# Patient Record
Sex: FEMALE | Race: WHITE | NOT HISPANIC OR LATINO | URBAN - METROPOLITAN AREA
[De-identification: names, ages, dates, MRNs, and addresses within clinical notes are randomized per-mention and may not be internally consistent; named-entity substitution may affect disease eponyms.]

---

## 2023-05-19 LAB
NATRIURETIC PEPTIDE B (PG/ML) IN SER/PLAS: 24 PG/ML (ref 0–99)
TROPONIN I, HIGH SENSITIVITY: 32 NG/L (ref 0–34)

## 2023-08-24 PROBLEM — C77.3 CARCINOMA OF LEFT BREAST METASTATIC TO AXILLARY LYMPH NODE (MULTI): Status: ACTIVE | Noted: 2023-08-24

## 2023-08-24 PROBLEM — N63.21 MASS OF UPPER OUTER QUADRANT OF LEFT BREAST: Status: ACTIVE | Noted: 2023-08-24

## 2023-08-24 PROBLEM — G43.909 MIGRAINE: Status: ACTIVE | Noted: 2023-08-24

## 2023-08-24 PROBLEM — C50.412: Status: ACTIVE | Noted: 2023-08-24

## 2023-08-24 PROBLEM — R59.0 AXILLARY LYMPHADENOPATHY: Status: ACTIVE | Noted: 2023-08-24

## 2023-08-24 PROBLEM — B37.31 CANDIDIASIS, VAGINA: Status: ACTIVE | Noted: 2023-08-24

## 2023-08-24 PROBLEM — C50.912 CARCINOMA OF LEFT BREAST METASTATIC TO AXILLARY LYMPH NODE (MULTI): Status: ACTIVE | Noted: 2023-08-24

## 2023-08-24 PROBLEM — Z17.0: Status: ACTIVE | Noted: 2023-08-24

## 2023-08-24 RX ORDER — DICYCLOMINE HYDROCHLORIDE 10 MG/1
10 CAPSULE ORAL 4 TIMES DAILY PRN
COMMUNITY

## 2023-08-24 RX ORDER — SUMATRIPTAN 50 MG/1
TABLET, FILM COATED ORAL
COMMUNITY
Start: 2015-04-15

## 2023-08-24 RX ORDER — METHOCARBAMOL 500 MG/1
500 TABLET, FILM COATED ORAL 4 TIMES DAILY PRN
COMMUNITY
Start: 2015-04-15

## 2023-09-14 ENCOUNTER — HOSPITAL ENCOUNTER (OUTPATIENT)
Dept: DATA CONVERSION | Facility: HOSPITAL | Age: 48
Discharge: HOME | End: 2023-09-14
Payer: OTHER GOVERNMENT

## 2023-09-14 DIAGNOSIS — C50.912 MALIGNANT NEOPLASM OF UNSPECIFIED SITE OF LEFT FEMALE BREAST (MULTI): ICD-10-CM

## 2023-09-14 LAB
MRSA DNA SPEC QL NAA+PROBE: NEGATIVE
SPECIMEN SOURCE: NORMAL

## 2023-09-20 ENCOUNTER — HOSPITAL ENCOUNTER (OUTPATIENT)
Dept: DATA CONVERSION | Facility: HOSPITAL | Age: 48
Discharge: HOME | End: 2023-09-20
Payer: OTHER GOVERNMENT

## 2023-09-20 DIAGNOSIS — Z45.2 ENCOUNTER FOR ADJUSTMENT AND MANAGEMENT OF VASCULAR ACCESS DEVICE: ICD-10-CM

## 2023-09-20 DIAGNOSIS — C50.912 MALIGNANT NEOPLASM OF UNSPECIFIED SITE OF LEFT FEMALE BREAST (MULTI): ICD-10-CM

## 2023-09-20 DIAGNOSIS — Q63.1 LOBULATED, FUSED AND HORSESHOE KIDNEY: ICD-10-CM

## 2023-09-20 DIAGNOSIS — C50.812 MALIGNANT NEOPLASM OF OVERLAPPING SITES OF LEFT FEMALE BREAST (MULTI): ICD-10-CM

## 2023-10-02 DIAGNOSIS — C77.3 CARCINOMA OF LEFT BREAST METASTATIC TO AXILLARY LYMPH NODE (MULTI): ICD-10-CM

## 2023-10-02 DIAGNOSIS — C50.912 CARCINOMA OF LEFT BREAST METASTATIC TO AXILLARY LYMPH NODE (MULTI): ICD-10-CM

## 2023-10-10 ENCOUNTER — OFFICE VISIT (OUTPATIENT)
Dept: SURGICAL ONCOLOGY | Facility: CLINIC | Age: 48
End: 2023-10-10
Payer: OTHER GOVERNMENT

## 2023-10-10 DIAGNOSIS — C50.912 MALIGNANT NEOPLASM OF LEFT BREAST IN FEMALE, ESTROGEN RECEPTOR POSITIVE, UNSPECIFIED SITE OF BREAST (MULTI): Primary | ICD-10-CM

## 2023-10-10 DIAGNOSIS — Z17.0 MALIGNANT NEOPLASM OF LEFT BREAST IN FEMALE, ESTROGEN RECEPTOR POSITIVE, UNSPECIFIED SITE OF BREAST (MULTI): Primary | ICD-10-CM

## 2023-10-10 PROCEDURE — 99024 POSTOP FOLLOW-UP VISIT: CPT | Performed by: SURGERY

## 2023-10-11 NOTE — PROGRESS NOTES
Subjective   Sapna Henriquez is a 47 y.o. female here for a postoperative visit.  She had a left mag seed localized lumpectomy and axillary sentinel lymph node biopsy on September 20, 2023.  She had a pathologic complete response.  SLN 0/3 with hyalinized fibrosis and foreign body reaction in 2 lymph nodes    She is doing well following surgery and has no complaints or concerns.    Objective     Physical Exam  Chest:          Comments: Both incisions are healing well with no evidence of infection, seroma or hematoma.      Alert and oriented.      Assessment/Plan   Stage  ypT0 N0 M0  breast cancer.  Clinical Stage 2B (T2 N2) 1/23    Diagnosed with left breast cancer and a positive left axillary lymph node in January 2023.  2.2 cm left breast mass in the 3 o'clock position and 3 abnormal axillary lymph nodes seen on ultrasound.  On 1/10/2023 left breast ultrasound core biopsy yielded invasive ductal carcinoma, grade 2-3, ductal carcinoma in situ, high nuclear grade, focus suspicious for lymphovascular space invasion. Left axillary ultrasound-guided biopsy yielded metastatic carcinoma involving cores of lymphoid tissue, ER +95%, DC +70%, HER2 equivocal not amplified by dual-maliha, MammaPrint high risk -0.154 luminal B type.  Clinical Stage 2B (T2 N2)  Pre-treatment CT showed a prominent internal mammary LN.    She had neoadjuvant chemotherapy (ddACx4 and taxol x12; Dr. Best). Her last chemotherapy was August 11, 2023.    Left mag seed localized lumpectomy and sentinel lymph node biopsy on September 20, 2023.      She is doing well following surgery.  She may resume all activities without restrictions.  Recommendations from interdisciplinary breast tumor conference were reviewed with the patient.    Follow-up with Dr. Best  2.   Follow-up with radiation oncology for radiation therapy.  3.   Follow-up with me in May 2024 with a bilateral mammogram.        Carol Rojas MD

## 2023-10-19 DIAGNOSIS — Z17.0 MALIGNANT NEOPLASM OF UPPER-OUTER QUADRANT OF LEFT BREAST IN FEMALE, ESTROGEN RECEPTOR POSITIVE (MULTI): ICD-10-CM

## 2023-10-19 DIAGNOSIS — C50.412 MALIGNANT NEOPLASM OF UPPER-OUTER QUADRANT OF LEFT BREAST IN FEMALE, ESTROGEN RECEPTOR POSITIVE (MULTI): ICD-10-CM

## 2023-10-20 ENCOUNTER — TELEPHONE (OUTPATIENT)
Dept: HEMATOLOGY/ONCOLOGY | Facility: HOSPITAL | Age: 48
End: 2023-10-20
Payer: MEDICAID

## 2023-10-20 ENCOUNTER — APPOINTMENT (OUTPATIENT)
Dept: HEMATOLOGY/ONCOLOGY | Facility: HOSPITAL | Age: 48
End: 2023-10-20
Payer: MEDICAID

## 2023-10-20 NOTE — TELEPHONE ENCOUNTER
Call returned and patient aware that she can take over the counter meds or see PCP for sore throat. She is agreeable and rescheduled visit for today.

## 2023-10-25 ENCOUNTER — HOSPITAL ENCOUNTER (OUTPATIENT)
Dept: RADIATION ONCOLOGY | Facility: CLINIC | Age: 48
Setting detail: RADIATION/ONCOLOGY SERIES
Discharge: STILL ADMITTED OR ADMINISTRATIVE | End: 2023-10-25
Payer: MEDICAID

## 2023-10-25 VITALS
BODY MASS INDEX: 24.62 KG/M2 | SYSTOLIC BLOOD PRESSURE: 127 MMHG | DIASTOLIC BLOOD PRESSURE: 80 MMHG | RESPIRATION RATE: 16 BRPM | HEART RATE: 68 BPM | WEIGHT: 122.14 LBS | HEIGHT: 59 IN | TEMPERATURE: 98 F | OXYGEN SATURATION: 98 %

## 2023-10-25 DIAGNOSIS — Z17.0 MALIGNANT NEOPLASM OF UPPER-OUTER QUADRANT OF LEFT BREAST IN FEMALE, ESTROGEN RECEPTOR POSITIVE (MULTI): ICD-10-CM

## 2023-10-25 DIAGNOSIS — C77.1: Primary | ICD-10-CM

## 2023-10-25 DIAGNOSIS — C77.3: Primary | ICD-10-CM

## 2023-10-25 DIAGNOSIS — C50.412 MALIGNANT NEOPLASM OF UPPER-OUTER QUADRANT OF LEFT BREAST IN FEMALE, ESTROGEN RECEPTOR POSITIVE (MULTI): ICD-10-CM

## 2023-10-25 DIAGNOSIS — C50.912: Primary | ICD-10-CM

## 2023-10-25 PROCEDURE — 99205 OFFICE O/P NEW HI 60 MIN: CPT | Performed by: STUDENT IN AN ORGANIZED HEALTH CARE EDUCATION/TRAINING PROGRAM

## 2023-10-25 PROCEDURE — 99215 OFFICE O/P EST HI 40 MIN: CPT | Performed by: STUDENT IN AN ORGANIZED HEALTH CARE EDUCATION/TRAINING PROGRAM

## 2023-10-25 SDOH — ECONOMIC STABILITY: FOOD INSECURITY: WITHIN THE PAST 12 MONTHS, YOU WORRIED THAT YOUR FOOD WOULD RUN OUT BEFORE YOU GOT MONEY TO BUY MORE.: NEVER TRUE

## 2023-10-25 SDOH — ECONOMIC STABILITY: FOOD INSECURITY: WITHIN THE PAST 12 MONTHS, THE FOOD YOU BOUGHT JUST DIDN'T LAST AND YOU DIDN'T HAVE MONEY TO GET MORE.: NEVER TRUE

## 2023-10-25 ASSESSMENT — LIFESTYLE VARIABLES
HOW MANY STANDARD DRINKS CONTAINING ALCOHOL DO YOU HAVE ON A TYPICAL DAY: 3 OR 4
AUDIT-C TOTAL SCORE: 4
HOW OFTEN DO YOU HAVE A DRINK CONTAINING ALCOHOL: 2-3 TIMES A WEEK
HOW OFTEN DO YOU HAVE SIX OR MORE DRINKS ON ONE OCCASION: NEVER
SKIP TO QUESTIONS 9-10: 0

## 2023-10-25 ASSESSMENT — SOCIAL DETERMINANTS OF HEALTH (SDOH): IN THE PAST 12 MONTHS, HAS THE ELECTRIC, GAS, OIL, OR WATER COMPANY THREATENED TO SHUT OFF SERVICE IN YOUR HOME?: NO

## 2023-10-25 ASSESSMENT — PATIENT HEALTH QUESTIONNAIRE - PHQ9
2. FEELING DOWN, DEPRESSED OR HOPELESS: NOT AT ALL
1. LITTLE INTEREST OR PLEASURE IN DOING THINGS: NOT AT ALL
SUM OF ALL RESPONSES TO PHQ9 QUESTIONS 1 & 2: 0

## 2023-10-25 ASSESSMENT — PAIN SCALES - GENERAL: PAINLEVEL: 0-NO PAIN

## 2023-10-25 ASSESSMENT — ENCOUNTER SYMPTOMS
DEPRESSION: 0
LOSS OF SENSATION IN FEET: 1
OCCASIONAL FEELINGS OF UNSTEADINESS: 0

## 2023-10-25 NOTE — PROGRESS NOTES
10/25/23 1300 New patient here today with DTR to see Dr. Calles for left breast invasive ductal carcinoma stage IIB, ER 95% (+), VT 70% (+), Her2 (-). Patient completed ddAC and weekly taxol x12 7/2023 and has a follow up appointment with Dr. Downing on 11/3/23. She also has appointment with Dr. Rojas on 5/2024 with a mammogram prior. Patient c/o hot flashes, rotten teeth, and numbness and tingling in fingers and toes. Patient will discuss symptoms with Dr. Downing but we gave the okay for patient to follow up with dentist asasusie. Per Dr. Calles, patient to be set up for ct/sim.  to schedule and call patient. Patient asking appropriate questions.  Patient given and reviewed information on external beam radiation and radiation to the  breast. We reviewed side effects of radiation including but not limited to skin irritation, and fatigue. Patient verbalizes understanding with verbal teach back. Lila Ramírez MSN, RN, OCN

## 2023-10-25 NOTE — PROGRESS NOTES
Radiation Oncology Outpatient Consult    Patient Name:  Sapna Henriquez  MRN:  [id number]  :  1975    Referring Provider: Carol Rojas MD  Primary Care Provider: José Luis Zambrano DO  Care Team: Patient Care Team:  José Luis Zambrano DO as PCP - General  Stew Bustos MD as Primary Care Provider  Torsten Best MD as Consulting Physician (Hematology and Oncology)    Date of Service: 10/25/2023     Diagnosis: Malignant neoplasm of upper-outer quadrant of left breast, estrogen receptor positive (CMS/HCC), Clinical: Stage IIIB (cT2, cN3b, cM0, G3, ER+, IA+, HER2: Equivocal)  Malignant neoplasm of upper-outer quadrant of left breast, estrogen receptor positive (CMS/HCC), Pathologic: No Stage Recommended (ypT0, pN0, cM0)    Previous Treatments:  2023: Completed neoadjuvant dose dense AC + T  2023: left breast lumpectomy and sentinel node biopsy    History of Present Illness:  Ms. Henriquez is a 47 y.o. woman whose primary physician palpated a left breast mass in 2022. Targeted ultrasound and diagnostic mammogram on 2022 showed a 2.2 x 2.2 x 1.4 cm mass in the left breast at the 3 o'clock position, 4 cm from the nipple. There were multiple abnormal appearing lymph nodes in the left axilla. Ultrasound-guided biopsy of the left breast mass and an axillary node  was performed on 1/10/2023, with pathology positive for invasive ductal carcinoma, grade 2-3 , ER/IA positive, HER2/xin equivocal/negative on FISH. CT chest abdomen and pelvis was performed on 2023, and showed multiple enlarged left axillary lymph nodes. Additionally seen was an enlarged left internal mammary lymph node, which measured up to 1 cm in size. No evidence of distant metastatic disease was seen. MammaPrint showed high risk, -0.154, luminal type B. She then underwent neoadjuvant dose dense AC+T, and completed her systemic therapy on 2023.     She had a right breast lumpectomy and sentinel node biopsy on 2023, which showed focal atypical  ductal hyperplasia in the breast, with a cavitated area with granulomatous inflammation consistent with the previous biopsy site. There were 3 sentinel nodes removed, which were all negative for metastatic carcinoma. One of the lymph nodes showed an area of hyalinized fibrosis and foreign body reaction.    Currently, she denies any breast pain or discomfort. She denies any bleeding, drainage, or wound healing difficulties. She has been having more frequent hot flashes since her chemotherapy. She has neuropathy in her hands and feet which is overall unchanged.           The patient was seen for an opinion regarding radiation options for the diagnosis above. I personally reviewed the available outside records and imaging studies as detailed in the HPI. The allergies, medications, past medical history, surgical history, social history, family history, and review of systems were reviewed.     Prior Radiotherapy:  No  Radiation Treatments       No radiation treatments to show. (Treatments may have been administered in another system.)          Current Systemic Treatment:  No     Presence of Pacemaker or ICD:  No    Past Medical History:  History reviewed. No pertinent past medical history.     Past Surgical History:    Past Surgical History:   Procedure Laterality Date    ENDOMETRIAL ABLATION      TUBAL LIGATION          Family History:  Cancer-related family history includes Breast cancer in her maternal grandmother, maternal great-grandfather, and mother; Ovarian cancer in her maternal grandmother.    Social History:    Social History     Tobacco Use    Smoking status: Never     Passive exposure: Never    Smokeless tobacco: Never   Vaping Use    Vaping Use: Never used   Substance Use Topics    Alcohol use: Yes     Alcohol/week: 4.0 standard drinks of alcohol     Types: 4 Shots of liquor per week       Allergies:  No Known Allergies     Medications:    Current Outpatient Medications:     dicyclomine (Bentyl) 10 mg  "capsule, Take 1 capsule (10 mg) by mouth 4 times a day as needed., Disp: , Rfl:     methocarbamol (Robaxin) 500 mg tablet, Take 1 tablet (500 mg) by mouth 4 times a day as needed., Disp: , Rfl:     SUMAtriptan (Imitrex) 50 mg tablet, 1 tablet PO for severe headache, may repeat in 2 hrs(max 200 mg/24 hrs) Orally, Disp: , Rfl:       Review of Systems:  Review of Systems - Oncology  The patient's current pain level was assessed.  They report currently having a pain of 0 out of 10.  They feel their pain is under control without the use of pain medications.    Performance Status:  The Karnofsky performance scale today is 80, Normal activity with effort; some signs or symptoms of disease (ECOG equivalent 1).        OBJECTIVE  Physical Exam:  /80 (BP Location: Right arm, Patient Position: Sitting)   Pulse 68   Temp 36.7 °C (98 °F) (Tympanic)   Resp 16   Ht 1.493 m (4' 10.78\")   Wt 55.4 kg (122 lb 2.2 oz)   SpO2 98%   BMI 24.85 kg/m²    Physical Exam  Vitals reviewed. Exam conducted with a chaperone present.   Constitutional:       General: She is not in acute distress.     Appearance: Normal appearance. She is not ill-appearing.   HENT:      Head: Normocephalic and atraumatic.      Right Ear: External ear normal.      Left Ear: External ear normal.      Mouth/Throat:      Mouth: Mucous membranes are moist.      Pharynx: Oropharynx is clear.   Eyes:      Conjunctiva/sclera: Conjunctivae normal.   Cardiovascular:      Rate and Rhythm: Normal rate.   Pulmonary:      Effort: Pulmonary effort is normal. No respiratory distress.   Chest:   Breasts:     Breasts are symmetrical.      Right: No swelling, bleeding, mass, skin change or tenderness.      Left: No swelling, bleeding, mass, skin change or tenderness.          Comments: Well healhed   Abdominal:      General: There is no distension.   Musculoskeletal:         General: Normal range of motion.      Cervical back: Normal range of motion and neck supple. "   Lymphadenopathy:      Upper Body:      Right upper body: No axillary adenopathy.      Left upper body: No axillary adenopathy.   Skin:     General: Skin is warm and dry.   Neurological:      Mental Status: She is alert and oriented to person, place, and time.   Psychiatric:         Mood and Affect: Mood normal.         Behavior: Behavior normal.          Laboratory Review:  There are no laboratory contraindications to radiation therapy.    The pertinent lab results were reviewed and discussed with the patient.  Notably, per HPI       Pathology Review:  The pertinent pathology results were reviewed and discussed with the patient.  Notably, per HPI      Imaging:  The pertinent imaging results were reviewed and discussed with the patient.  Notably, per HPI        ASSESSMENT:  Ms. Henriquez is a 47 y.o. woman with a diagnosis of Malignant neoplasm of upper-outer quadrant of left breast, estrogen receptor positive (CMS/HCC), Clinical: Stage IIIB (cT2, cN3b, cM0, G3, ER+, [place]+, HER2: Equivocal)  Malignant neoplasm of upper-outer quadrant of left breast, estrogen receptor positive (CMS/HCC), Pathologic: No Stage Recommended (ypT0, pN0, cM0), status post neoadjuvant chemotherapy, followed by lumpectomy and sentinel node biopsy, here for a discussion of adjuvant radiation treatment.      PLAN:  We had an extensive discussion regarding role of radiation in this setting.     We reviewed the standard of care for post-lumpectomy treatment for locally advanced breast cancer, which would include adjuvant radiotherapy to help decrease the risk of local recurrence in the breast. This would consist of hypofractionated radiotherapy, to a dose of 42.56 Gy in 16 fractions. Given the presence of at least 1 axillary lymph node, as well as a highly suspicious intramammary node, we will plan on treating breast and regional lymph nodes; we would also plan on a simultaneous integrated boost to the previously involved/undissected internal mammary  node, as well as to the lumpectomy bed, given her younger age and high-grade disease.    We discussed the acute side effects of therapies and potential late toxicities. During the course of radiation, acute side effects could include, but are not limited to fatigue, dermatitis, or chest wall discomfort. The typical timeline for the resolution of these effects as well as available supportive therapies were reviewed. Potential long term side effects can include, but are not limited to, lymphedema, fibrosis, increased heart disease risk, rib fractures, radiation pneumonitis, and a small risk of second malignancies. Given her left-sided disease, we will plan on treatment using ABC for breathing control to help reduce incidental dose to the heart.    After the discussion, the patient was given the opportunity to ask questions which were subsequently answered, and our contact information was given.    Please contact our department with any further questions regarding the plan of management.    The length of the visit was 60 minutes. We spent more than 50% of this time discussing treatment options with the patient.    Recommendations:  - Whole breast and regional adriana radiation to a dose of 42.56 Gy in 16 fractions with a simultaneous integrated boost to the positive intramammary node and lumpectomy bed  - CT simulation in the coming weeks   NCCN Guidelines were applicable to guide this patients treatment plan.   Starla Calles DO

## 2023-11-03 ENCOUNTER — LAB (OUTPATIENT)
Dept: LAB | Facility: HOSPITAL | Age: 48
End: 2023-11-03
Payer: MEDICAID

## 2023-11-03 ENCOUNTER — OFFICE VISIT (OUTPATIENT)
Dept: HEMATOLOGY/ONCOLOGY | Facility: HOSPITAL | Age: 48
End: 2023-11-03
Payer: MEDICAID

## 2023-11-03 VITALS
OXYGEN SATURATION: 97 % | DIASTOLIC BLOOD PRESSURE: 68 MMHG | TEMPERATURE: 96.8 F | SYSTOLIC BLOOD PRESSURE: 100 MMHG | RESPIRATION RATE: 16 BRPM | BODY MASS INDEX: 24.31 KG/M2 | HEIGHT: 59 IN | HEART RATE: 89 BPM | WEIGHT: 120.59 LBS

## 2023-11-03 DIAGNOSIS — Z17.0 MALIGNANT NEOPLASM OF UPPER-OUTER QUADRANT OF LEFT BREAST IN FEMALE, ESTROGEN RECEPTOR POSITIVE (MULTI): Primary | ICD-10-CM

## 2023-11-03 DIAGNOSIS — C50.412 MALIGNANT NEOPLASM OF UPPER-OUTER QUADRANT OF LEFT BREAST IN FEMALE, ESTROGEN RECEPTOR POSITIVE (MULTI): ICD-10-CM

## 2023-11-03 DIAGNOSIS — Z17.0 MALIGNANT NEOPLASM OF UPPER-OUTER QUADRANT OF LEFT BREAST IN FEMALE, ESTROGEN RECEPTOR POSITIVE (MULTI): ICD-10-CM

## 2023-11-03 DIAGNOSIS — C50.412 MALIGNANT NEOPLASM OF UPPER-OUTER QUADRANT OF LEFT BREAST IN FEMALE, ESTROGEN RECEPTOR POSITIVE (MULTI): Primary | ICD-10-CM

## 2023-11-03 LAB
ESTRADIOL SERPL-MCNC: <19 PG/ML
FSH SERPL-ACNC: 116.6 IU/L
HOLD SPECIMEN: NORMAL
HOLD SPECIMEN: NORMAL
LH SERPL-ACNC: 86.2 IU/L

## 2023-11-03 PROCEDURE — 82670 ASSAY OF TOTAL ESTRADIOL: CPT

## 2023-11-03 PROCEDURE — 36415 COLL VENOUS BLD VENIPUNCTURE: CPT

## 2023-11-03 PROCEDURE — 1036F TOBACCO NON-USER: CPT | Performed by: STUDENT IN AN ORGANIZED HEALTH CARE EDUCATION/TRAINING PROGRAM

## 2023-11-03 PROCEDURE — 83002 ASSAY OF GONADOTROPIN (LH): CPT

## 2023-11-03 PROCEDURE — 99214 OFFICE O/P EST MOD 30 MIN: CPT | Performed by: STUDENT IN AN ORGANIZED HEALTH CARE EDUCATION/TRAINING PROGRAM

## 2023-11-03 RX ORDER — GABAPENTIN 100 MG/1
100 CAPSULE ORAL AS NEEDED
COMMUNITY

## 2023-11-03 RX ORDER — VENLAFAXINE HYDROCHLORIDE 37.5 MG/1
37.5 CAPSULE, EXTENDED RELEASE ORAL DAILY
Qty: 30 CAPSULE | Refills: 2 | Status: SHIPPED | OUTPATIENT
Start: 2023-11-03 | End: 2024-02-01

## 2023-11-03 ASSESSMENT — PAIN SCALES - GENERAL: PAINLEVEL: 0-NO PAIN

## 2023-11-03 NOTE — PROGRESS NOTES
Patient ID: Sapna Henriquez is a 47 y.o. female.    The patient presents to clinic today for her history of breast cancer.    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, estrogen receptor positive (CMS/HCC)  Staging form: Breast, AJCC 8th Edition  - Clinical stage from 10/25/2023: Stage IIIB (cT2, cN3b, cM0, G3, ER+, TX+, HER2: Equivocal) - Signed by Starla Calles DO on 10/25/2023  - Pathologic stage from 10/25/2023: No Stage Recommended (ypT0, pN0, cM0) - Signed by Starla Calles DO on 10/25/2023        Diagnostic/Therapeutic History:    On 12/21/2022 she had diagnostic mammogram that was done that showed heterogeneously dense breast tissue with a focal symptom marker overlying the outer left breast  at anterior depth, adjacent to the marker is an irregular increased density mass with architectural distortion.  BI-RADS Category 5   Ultrasound: This corresponds with an area of the lump localized at the 3 o'clock position of the left breast 4 cm from the nipple there was a 2.2 x 2.2 x 1.1 cm lobulated irregularly irregular hypoechoic mass with multiple abnormal appearing left axillary  lymph nodes.      On 1/10/2023 she had left breast ultrasound-guided core needle biopsy showed invasive ductal carcinoma, grade 2-3, DCIS, high nuclear grade ER 95%, TX 70%, HER2 2+, nonamplified by dual ROMERO MammaPrint index: -0.154, high risk luminal B left axilla ultrasound-guided  core needle biopsy showed metastatic carcinoma       CT C/A/P showed focal breast tissue nodularity and prominent left axillary LN in addition to left Internal mammary LN with no evidence of distant disease; no evidence of bone mets on bone scan     8/11/2023: completed neoadjuvant chemotherapy with ddAC followed by weekly taxol    09/20/2023: Dr Rojas Performed a left PM and SLNB (0/3) that showed a complete pathological response           History of Present Illness (HPI)/Interval History:  Doing well, recovering well from  surgery  Does have hot flushes, quite severe     She denies any fevers or chills.    She denies any chest pain or breathing issues.     She denies any vision changes or headache issues, dizziness, loss of balance,  and no falls.     She denies any new or unexplained bone aches or pains.    She denies any skin lesions or masses, hair loss, nail changes, or oral sores.    She reports a normal appetite and normal bowel movements. Her weight is stable.     PMH:  No PMH , horseshoe kidney     PSH: tubal ligation,      Allergies: NKDA     No meds     Reproductive hx: menarche 14, 9 years ago no menses since ligation, , 2nd one who got , OCP x couple years, No HRT     Family hx: mom at age 41 with breast cancer, grandma breast cancer and ovarian cancer  at 73, greaatgrandma breast cancer  at 58, uncle for mom’s side colon cancer, grandfather on mother side has skin cancer     Social hx: non smoker, alcohol twice a week          Review of Systems:  14-point ROS otherwise negative, as per HPI.    No past medical history on file.    Past Surgical History:   Procedure Laterality Date    ENDOMETRIAL ABLATION      TUBAL LIGATION         Social History     Socioeconomic History    Marital status: Legally      Spouse name: Not on file    Number of children: Not on file    Years of education: Not on file    Highest education level: Not on file   Occupational History    Not on file   Tobacco Use    Smoking status: Never     Passive exposure: Never    Smokeless tobacco: Never   Vaping Use    Vaping Use: Never used   Substance and Sexual Activity    Alcohol use: Yes     Alcohol/week: 4.0 standard drinks of alcohol     Types: 4 Shots of liquor per week    Drug use: Not on file    Sexual activity: Not on file   Other Topics Concern    Not on file   Social History Narrative    Not on file     Social Determinants of Health     Financial Resource Strain: Not on file   Food Insecurity: No Food Insecurity  "(10/25/2023)    Hunger Vital Sign     Worried About Running Out of Food in the Last Year: Never true     Ran Out of Food in the Last Year: Never true   Transportation Needs: Not on file   Physical Activity: Not on file   Stress: Not on file   Social Connections: Not on file   Intimate Partner Violence: Not on file   Housing Stability: Not on file       No Known Allergies      Current Outpatient Medications:     gabapentin (Neurontin) 100 mg capsule, Take 1 capsule (100 mg) by mouth if needed., Disp: , Rfl:     dicyclomine (Bentyl) 10 mg capsule, Take 1 capsule (10 mg) by mouth 4 times a day as needed., Disp: , Rfl:     methocarbamol (Robaxin) 500 mg tablet, Take 1 tablet (500 mg) by mouth 4 times a day as needed., Disp: , Rfl:     SUMAtriptan (Imitrex) 50 mg tablet, 1 tablet PO for severe headache, may repeat in 2 hrs(max 200 mg/24 hrs) Orally, Disp: , Rfl:     traMADol (Ultram) 50 mg tablet, TAKE 1 TABLET BY MOUTH EVERY 6 HOURS AS NEEDED FOR PAIN (Patient not taking: Reported on 11/3/2023), Disp: 10 tablet, Rfl: 0    venlafaxine XR (Effexor XR) 37.5 mg 24 hr capsule, Take 1 capsule (37.5 mg) by mouth once daily. Do not crush or chew., Disp: 30 capsule, Rfl: 2     Objective    BSA: 1.51 meters squared  /68   Pulse 89   Temp 36 °C (96.8 °F) (Skin)   Resp 16   Ht 1.493 m (4' 10.78\")   Wt 54.7 kg (120 lb 9.5 oz)   SpO2 97%   BMI 24.54 kg/m²     ECOG Performance Status: 0    Physical Exam    GA: alert, oriented, cooperative  HEENT: anicteric sclera, well injected conjunctiva  Heart: RRR, no murmurs or gallops heard  Lung: CTAB  Abd: +BS, soft, non tender abdomen  Ext; peripheral pulses positive, warm extremities, no lower extremity edema   Breast;: well healed incision on the left side, no new LN or nodule    Laboratory Data:  Lab Results   Component Value Date    WBC 4.8 08/11/2023    HGB 12.7 08/11/2023    HCT 37.5 08/11/2023    MCV 91 08/11/2023     08/11/2023    ANC 6.42 04/28/2023       " Chemistry    Lab Results   Component Value Date/Time     08/11/2023 1018    K 3.7 08/11/2023 1018     08/11/2023 1018    CO2 25 08/11/2023 1018    BUN 13 08/11/2023 1018    CREATININE 0.70 08/11/2023 1018    Lab Results   Component Value Date/Time    CALCIUM 9.7 08/11/2023 1018    ALKPHOS 79 08/11/2023 1018    AST 25 08/11/2023 1018    ALT 38 08/11/2023 1018    BILITOT 0.6 08/11/2023 1018             Radiology:  XR breast specimen  PROCEDURE:         SURGICAL SPECIMEN XRAY - IMM  5003  REASON FOR EXAM: r92.8    RESULT: MRN: [id number]  Patient Name: CHARLINE MCMILLAN    STUDY:  SURGICAL SPECIMEN XRAY; 9/20/2023 1:33 pm    INDICATION:  r92.8. Left breast malignancy    COMPARISON:  None.    ACCESSION NUMBER(S):  [id number]    ORDERING CLINICIAN:  JUAN PABLO YA    TECHNIQUE:  Radiograph of the surgical breast specimen was obtained.    FINDINGS:  The specimen contains the Mag seed corresponding with grid positions H10 and  H 11. The minimally invasive breast biopsy clip is not included within the  specimen. This was discussed with the referring physician.    IMPRESSION:  Successful excision of the area of concern containing the Mag seed.    MACRO:  none  Dictation workstation:   [id number]  Patient was entered into a reminder system and will receive a notification  with a target date for their next exam.    Original Interpreting Physician:   LINDSAY BILLINGSLEY M.D.  Original Transcribed by/Date: MMODAL Sep 20 2023  1:30P  Original Electronically Signed by/Date: LINDSAY BILLINGSLEY M.D. Sep 20 2023  2:59P    Addendum Interpreting Physician:  Addendum Transcribed by/Date: NO ADDENDUM  Addendum Electronically Signed by/Date:  XR breast specimen  PROCEDURE:         SURGICAL SPECIMEN XRAY - IMM  5003  REASON FOR EXAM: c50.912    RESULT: MRN: [id number]  Patient Name: CHARLINE MCMILLAN    STUDY:  SURGICAL SPECIMEN XRAY; 9/20/2023 1:02 pm    INDICATION:  c50.912. Left breast malignancy    COMPARISON:  None.    ACCESSION  NUMBER(S):  [id number]    ORDERING CLINICIAN:  JUAN PABLO YA    TECHNIQUE:  Radiograph of the left axillary lymph node specimen was obtained.    FINDINGS:  The specimen is demarcated as lymph node the breast tissue demonstrates a  Mag seed corresponding with grid positions G5 and H 5. There is a breast  biopsy clip also included within the specimen corresponding with grid  positions J 4 and J 3.    IMPRESSION:  Successful excision of the left axillary Mag seed and breast biopsy clip.    MACRO:  none  Dictation workstation:   [id number]  Patient was entered into a reminder system and will receive a notification  with a target date for their next exam.    Original Interpreting Physician:   LINDSAY BILLINGSLEY M.D.  Original Transcribed by/Date: MMODAL Sep 20 2023  8:34A  Original Electronically Signed by/Date: LINDSAY BILLINGSLEY M.D. Sep 20 2023  2:58P    Addendum Interpreting Physician:  Addendum Transcribed by/Date: NO ADDENDUM  Addendum Electronically Signed by/Date:       No results found for this or any previous visit from the past 365 days.          Assessment/Plan:      47 year old female overall medically healthy with recent diagnosis of left sided cT2N1 IDC ER 95%, ID 70%, HER2 2+, nonamplified by dual ROMERO MammaPrint index: -0.154, high risk luminal  B left axilla ultrasound-guided core needle biopsy showed metastatic carcinoma, discussed in TB with plan for neoadjuvant chemotherapy      On 12/21/2022 she had diagnostic mammogram that was done that showed heterogeneously dense breast tissue with a focal symptom marker overlying the outer left breast at anterior depth, adjacent to the marker is an irregular increased density mass with  architectural distortion.  BI-RADS Category 5   Ultrasound: This corresponds with an area of the lump localized at the 3 o'clock position of the left breast 4 cm from the nipple there was a 2.2 x 2.2 x 1.1 cm lobulated irregularly  irregular hypoechoic mass with multiple abnormal appearing  left axillary lymph nodes.      On 1/10/2023 she had left breast ultrasound-guided core needle biopsy showed invasive ductal carcinoma, grade 2-3, DCIS, high nuclear grade ER 95%, MA 70%, HER2 2+, nonamplified by dual ROMERO MammaPrint index: -0.154, high risk luminal B left axilla ultrasound-guided  core needle biopsy showed metastatic carcinoma     I reviewed with her the events that led to her diagnosis of breast cancer. We reviewed all the procedures and diagnostic imaging she underwent thus far. I discussed the features of her breast cancer that include the size, grade, lymph node status and  hormone receptor/ her2-xin status.     CT C/A/P showed focal breast tissue nodularity and prominent left axillary LN in addition to left Internal mammary LN with no evidence of distant disease; no evidence of bone mets on bone scan     8/11/2023: completed neoadjuvant chemotherapy with ddAC followed by weekly taxol    09/20/2023: Dr Rojas Performed a left PM and SLNB (0/3) that showed a complete pathological response     - plan for Whole breast and regional adriana radiation to a dose of 42.56 Gy in 16 fractions with a simultaneous integrated boost to the positive intramammary node and lumpectomy bed    Fibroids  Pelvic/Uterine/Ovarian US: Fibroid in the right lower uterine body and Probable hemorrhagic corpus luteum left ovary.   - Follow-up with GYN      Thyroid nodules  Thyroid US: Right thyroid lobe 1.5 cm and left thyroid lobe 0.6 cm TIRADS 2 mixed solid and cystic nodules which are likely benign   - Likely benign no follow-up imaging recommended   - Defer to PCP for continued monitoring       RTC after radiation therapy to initate hormonal therapy. LH, FSH and estradiol ordered for today  Prescribed effexor 37.5mg for hot flushes     Orders Placed This Encounter   Procedures    FSH & LH     Standing Status:   Future     Number of Occurrences:   1     Standing Expiration Date:   11/3/2024     Order Specific Question:    Release result to MyChart     Answer:   Immediate [1]    Estradiol     Standing Status:   Future     Number of Occurrences:   1     Standing Expiration Date:   11/3/2024     Order Specific Question:   Release result to [facility]     Answer:   Immediate [1]             Torsten Best MD  Hematology and Medical Oncology  [facility]

## 2023-11-09 ENCOUNTER — APPOINTMENT (OUTPATIENT)
Dept: RADIATION ONCOLOGY | Facility: CLINIC | Age: 48
End: 2023-11-09
Payer: MEDICAID

## 2023-11-10 ENCOUNTER — HOSPITAL ENCOUNTER (OUTPATIENT)
Dept: RADIOLOGY | Facility: EXTERNAL LOCATION | Age: 48
Discharge: HOME | End: 2023-11-10
Payer: MEDICAID

## 2023-11-10 DIAGNOSIS — C50.812: ICD-10-CM

## 2023-11-13 ENCOUNTER — HOSPITAL ENCOUNTER (OUTPATIENT)
Dept: RADIATION ONCOLOGY | Facility: CLINIC | Age: 48
Setting detail: RADIATION/ONCOLOGY SERIES
Discharge: HOME | End: 2023-11-13
Payer: MEDICAID

## 2023-11-13 PROCEDURE — 77334 RADIATION TREATMENT AID(S): CPT | Performed by: STUDENT IN AN ORGANIZED HEALTH CARE EDUCATION/TRAINING PROGRAM

## 2023-11-13 PROCEDURE — 77263 THER RADIOLOGY TX PLNG CPLX: CPT | Performed by: STUDENT IN AN ORGANIZED HEALTH CARE EDUCATION/TRAINING PROGRAM

## 2023-11-13 NOTE — PROGRESS NOTES
CT sim. Pt was educated on what to expect for CT sim, what to expect when xRT starts, OTV schedule, side effects, oriented to waiting area, changing rooms, and lockers. All questions answered. Verbalized understanding using teach back. No further concerns at this time.

## 2023-11-27 LAB
GENETICS TEST NAME-DATA CONVERSION: NORMAL
LAB MOLECULAR CA TECHNICAL NOTES: NORMAL

## 2023-12-07 ENCOUNTER — HOSPITAL ENCOUNTER (OUTPATIENT)
Dept: RADIATION ONCOLOGY | Facility: CLINIC | Age: 48
Setting detail: RADIATION/ONCOLOGY SERIES
Discharge: HOME | End: 2023-12-07
Payer: MEDICAID

## 2023-12-07 PROCEDURE — 77338 DESIGN MLC DEVICE FOR IMRT: CPT | Performed by: STUDENT IN AN ORGANIZED HEALTH CARE EDUCATION/TRAINING PROGRAM

## 2023-12-07 PROCEDURE — 77301 RADIOTHERAPY DOSE PLAN IMRT: CPT | Performed by: STUDENT IN AN ORGANIZED HEALTH CARE EDUCATION/TRAINING PROGRAM

## 2023-12-07 PROCEDURE — 77300 RADIATION THERAPY DOSE PLAN: CPT | Performed by: STUDENT IN AN ORGANIZED HEALTH CARE EDUCATION/TRAINING PROGRAM

## 2023-12-07 PROCEDURE — 77293 RESPIRATOR MOTION MGMT SIMUL: CPT | Performed by: STUDENT IN AN ORGANIZED HEALTH CARE EDUCATION/TRAINING PROGRAM

## 2023-12-12 ENCOUNTER — HOSPITAL ENCOUNTER (OUTPATIENT)
Dept: RADIATION ONCOLOGY | Facility: CLINIC | Age: 48
Setting detail: RADIATION/ONCOLOGY SERIES
Discharge: HOME | End: 2023-12-12
Payer: MEDICAID

## 2023-12-12 DIAGNOSIS — Z51.0 ENCOUNTER FOR ANTINEOPLASTIC RADIATION THERAPY: ICD-10-CM

## 2023-12-12 DIAGNOSIS — C77.3 SECONDARY AND UNSPECIFIED MALIGNANT NEOPLASM OF AXILLA AND UPPER LIMB LYMPH NODES (MULTI): ICD-10-CM

## 2023-12-12 DIAGNOSIS — C50.812 MALIGNANT NEOPLASM OF OVERLAPPING SITES OF LEFT FEMALE BREAST (MULTI): ICD-10-CM

## 2023-12-12 LAB
RAD ONC MSQ ACTUAL FRACTIONS DELIVERED: 1
RAD ONC MSQ ACTUAL SESSION DELIVERED DOSE: 320 CGRAY
RAD ONC MSQ ACTUAL TOTAL DOSE: 320 CGRAY
RAD ONC MSQ ELAPSED DAYS: 0
RAD ONC MSQ LAST DATE: NORMAL
RAD ONC MSQ PRESCRIBED FRACTIONAL DOSE: 320 CGRAY
RAD ONC MSQ PRESCRIBED NUMBER OF FRACTIONS: 15
RAD ONC MSQ PRESCRIBED TECHNIQUE: NORMAL
RAD ONC MSQ PRESCRIBED TOTAL DOSE: 4800 CGRAY
RAD ONC MSQ PRESCRIPTION PATTERN COMMENT: NORMAL
RAD ONC MSQ START DATE: NORMAL
RAD ONC MSQ TREATMENT COURSE NUMBER: 1
RAD ONC MSQ TREATMENT SITE: NORMAL

## 2023-12-12 PROCEDURE — 77014 CHG CT GUIDANCE RADIATION THERAPY FLDS PLACEMENT: CPT | Performed by: STUDENT IN AN ORGANIZED HEALTH CARE EDUCATION/TRAINING PROGRAM

## 2023-12-12 PROCEDURE — 77385 HC INTENSITY-MODULATED RADIATION THERAPY (IMRT), SIMPLE: CPT | Performed by: STUDENT IN AN ORGANIZED HEALTH CARE EDUCATION/TRAINING PROGRAM

## 2023-12-13 ENCOUNTER — HOSPITAL ENCOUNTER (OUTPATIENT)
Dept: RADIATION ONCOLOGY | Facility: CLINIC | Age: 48
Setting detail: RADIATION/ONCOLOGY SERIES
Discharge: HOME | End: 2023-12-13
Payer: MEDICAID

## 2023-12-13 ENCOUNTER — RADIATION ONCOLOGY OTV (OUTPATIENT)
Dept: RADIATION ONCOLOGY | Facility: CLINIC | Age: 48
End: 2023-12-13
Payer: MEDICAID

## 2023-12-13 VITALS
OXYGEN SATURATION: 98 % | RESPIRATION RATE: 18 BRPM | BODY MASS INDEX: 24.25 KG/M2 | HEART RATE: 69 BPM | DIASTOLIC BLOOD PRESSURE: 77 MMHG | TEMPERATURE: 97 F | SYSTOLIC BLOOD PRESSURE: 115 MMHG | WEIGHT: 119.16 LBS

## 2023-12-13 DIAGNOSIS — Z51.0 ENCOUNTER FOR ANTINEOPLASTIC RADIATION THERAPY: ICD-10-CM

## 2023-12-13 DIAGNOSIS — C50.812 MALIGNANT NEOPLASM OF OVERLAPPING SITES OF LEFT FEMALE BREAST (MULTI): ICD-10-CM

## 2023-12-13 DIAGNOSIS — C77.3 SECONDARY AND UNSPECIFIED MALIGNANT NEOPLASM OF AXILLA AND UPPER LIMB LYMPH NODES (MULTI): ICD-10-CM

## 2023-12-13 LAB
RAD ONC MSQ ACTUAL FRACTIONS DELIVERED: 2
RAD ONC MSQ ACTUAL SESSION DELIVERED DOSE: 320 CGRAY
RAD ONC MSQ ACTUAL TOTAL DOSE: 640 CGRAY
RAD ONC MSQ ELAPSED DAYS: 1
RAD ONC MSQ LAST DATE: NORMAL
RAD ONC MSQ PRESCRIBED FRACTIONAL DOSE: 320 CGRAY
RAD ONC MSQ PRESCRIBED NUMBER OF FRACTIONS: 15
RAD ONC MSQ PRESCRIBED TECHNIQUE: NORMAL
RAD ONC MSQ PRESCRIBED TOTAL DOSE: 4800 CGRAY
RAD ONC MSQ PRESCRIPTION PATTERN COMMENT: NORMAL
RAD ONC MSQ START DATE: NORMAL
RAD ONC MSQ TREATMENT COURSE NUMBER: 1
RAD ONC MSQ TREATMENT SITE: NORMAL

## 2023-12-13 PROCEDURE — 77427 RADIATION TX MANAGEMENT X5: CPT | Performed by: STUDENT IN AN ORGANIZED HEALTH CARE EDUCATION/TRAINING PROGRAM

## 2023-12-13 PROCEDURE — 77385 HC INTENSITY-MODULATED RADIATION THERAPY (IMRT), SIMPLE: CPT | Performed by: STUDENT IN AN ORGANIZED HEALTH CARE EDUCATION/TRAINING PROGRAM

## 2023-12-13 PROCEDURE — 77336 RADIATION PHYSICS CONSULT: CPT | Performed by: STUDENT IN AN ORGANIZED HEALTH CARE EDUCATION/TRAINING PROGRAM

## 2023-12-13 PROCEDURE — 77014 CHG CT GUIDANCE RADIATION THERAPY FLDS PLACEMENT: CPT | Performed by: STUDENT IN AN ORGANIZED HEALTH CARE EDUCATION/TRAINING PROGRAM

## 2023-12-13 ASSESSMENT — PAIN SCALES - GENERAL: PAINLEVEL: 0-NO PAIN

## 2023-12-13 NOTE — PROGRESS NOTES
Radiation Oncology On Treatment Visit    Patient Name:  Sapna Henriquez  MRN:  [id number]  :  1975    Referring Provider: No ref. 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             SUBJECTIVE: Just started treatment and tolerating well. Discussed skin care and expected toxicity.      OBJECTIVE:   Vital Signs:  /77 (BP Location: Right arm, Patient Position: Sitting)   Pulse 69   Temp 36.1 °C (97 °F)   Resp 18   Wt 54.1 kg (119 lb 2.5 oz)   SpO2 98%   BMI 24.25 kg/m²    Pain Scale: The patient's current pain level was assessed.  They report currently having a pain of 0 out of 10.    Other Pertinent Findings:     Toxicity Assessment          2023    13:39   Toxicity Assessment   Treatment Site Breast   Anorexia Grade 1   Anxiety Grade 0   Dehydration Grade 1   Depression Grade 0   Dermatitis Radiation Grade 0   Diarrhea Grade 0   Fatigue Grade 0   Fibrosis Deep Connective Tissue Grade 0   Fracture Grade 0   Nausea Grade 0   Pain Grade 0   Treatment Related Secondary Malignancy  Grade 0   Tumor Pain Grade 0   Vomiting Grade 0   Abdominal Pain Grade 0   Dysphagia Grade 0   Esophagitis Grade 0   Gastric Hemorrhage Grade 0   Mucositis Oral Grade 0   Dry Mouth Grade 0   Breast Infection Grade 0   Seroma Grade 0   Joint Range of Motion Decreased Grade 0   Joint Range of Motion Decreased Lumbar Spine Grade 0   Brachial Plexopathy Grade 0   Breast Atrophy Grade 0   Breast Pain Grade 0   Nipple Deformity Grade 0   Pneumonitis Grade 0   Edema Limbs Grade 0   Lymphedema Grade 0   Thromboembolic Event Grade 0   Hot Flashes Grade 0        Assessment / Plan:  The patient is tolerating radiation therapy as anticipated.  Continue per current treatment plan.

## 2023-12-14 ENCOUNTER — HOSPITAL ENCOUNTER (OUTPATIENT)
Dept: RADIATION ONCOLOGY | Facility: CLINIC | Age: 48
Setting detail: RADIATION/ONCOLOGY SERIES
Discharge: HOME | End: 2023-12-14
Payer: MEDICAID

## 2023-12-14 PROCEDURE — 77385 HC INTENSITY-MODULATED RADIATION THERAPY (IMRT), SIMPLE: CPT | Performed by: STUDENT IN AN ORGANIZED HEALTH CARE EDUCATION/TRAINING PROGRAM

## 2023-12-14 PROCEDURE — 77014 CHG CT GUIDANCE RADIATION THERAPY FLDS PLACEMENT: CPT | Performed by: STUDENT IN AN ORGANIZED HEALTH CARE EDUCATION/TRAINING PROGRAM

## 2023-12-15 ENCOUNTER — HOSPITAL ENCOUNTER (OUTPATIENT)
Dept: RADIATION ONCOLOGY | Facility: CLINIC | Age: 48
Setting detail: RADIATION/ONCOLOGY SERIES
Discharge: HOME | End: 2023-12-15
Payer: MEDICAID

## 2023-12-15 DIAGNOSIS — C50.812 MALIGNANT NEOPLASM OF OVERLAPPING SITES OF LEFT FEMALE BREAST (MULTI): ICD-10-CM

## 2023-12-15 DIAGNOSIS — Z51.0 ENCOUNTER FOR ANTINEOPLASTIC RADIATION THERAPY: ICD-10-CM

## 2023-12-15 DIAGNOSIS — C77.3 SECONDARY AND UNSPECIFIED MALIGNANT NEOPLASM OF AXILLA AND UPPER LIMB LYMPH NODES (MULTI): ICD-10-CM

## 2023-12-15 LAB
RAD ONC MSQ ACTUAL FRACTIONS DELIVERED: 4
RAD ONC MSQ ACTUAL SESSION DELIVERED DOSE: 320 CGRAY
RAD ONC MSQ ACTUAL TOTAL DOSE: 1280 CGRAY
RAD ONC MSQ ELAPSED DAYS: 3
RAD ONC MSQ LAST DATE: NORMAL
RAD ONC MSQ PRESCRIBED FRACTIONAL DOSE: 320 CGRAY
RAD ONC MSQ PRESCRIBED NUMBER OF FRACTIONS: 15
RAD ONC MSQ PRESCRIBED TECHNIQUE: NORMAL
RAD ONC MSQ PRESCRIBED TOTAL DOSE: 4800 CGRAY
RAD ONC MSQ PRESCRIPTION PATTERN COMMENT: NORMAL
RAD ONC MSQ START DATE: NORMAL
RAD ONC MSQ TREATMENT COURSE NUMBER: 1
RAD ONC MSQ TREATMENT SITE: NORMAL

## 2023-12-15 PROCEDURE — 77014 CHG CT GUIDANCE RADIATION THERAPY FLDS PLACEMENT: CPT | Performed by: STUDENT IN AN ORGANIZED HEALTH CARE EDUCATION/TRAINING PROGRAM

## 2023-12-15 PROCEDURE — 77385 HC INTENSITY-MODULATED RADIATION THERAPY (IMRT), SIMPLE: CPT | Performed by: STUDENT IN AN ORGANIZED HEALTH CARE EDUCATION/TRAINING PROGRAM

## 2023-12-18 ENCOUNTER — APPOINTMENT (OUTPATIENT)
Dept: RADIATION ONCOLOGY | Facility: CLINIC | Age: 48
End: 2023-12-18
Payer: MEDICAID

## 2023-12-18 ENCOUNTER — TELEPHONE (OUTPATIENT)
Dept: RADIATION ONCOLOGY | Facility: CLINIC | Age: 48
End: 2023-12-18
Payer: MEDICAID

## 2023-12-19 ENCOUNTER — HOSPITAL ENCOUNTER (OUTPATIENT)
Dept: RADIATION ONCOLOGY | Facility: CLINIC | Age: 48
Setting detail: RADIATION/ONCOLOGY SERIES
Discharge: HOME | End: 2023-12-19
Payer: MEDICAID

## 2023-12-19 DIAGNOSIS — Z51.0 ENCOUNTER FOR ANTINEOPLASTIC RADIATION THERAPY: ICD-10-CM

## 2023-12-19 DIAGNOSIS — C50.812 MALIGNANT NEOPLASM OF OVERLAPPING SITES OF LEFT FEMALE BREAST (MULTI): ICD-10-CM

## 2023-12-19 DIAGNOSIS — C77.3 SECONDARY AND UNSPECIFIED MALIGNANT NEOPLASM OF AXILLA AND UPPER LIMB LYMPH NODES (MULTI): ICD-10-CM

## 2023-12-19 LAB
RAD ONC MSQ ACTUAL FRACTIONS DELIVERED: 5
RAD ONC MSQ ACTUAL SESSION DELIVERED DOSE: 320 CGRAY
RAD ONC MSQ ACTUAL TOTAL DOSE: 1600 CGRAY
RAD ONC MSQ ELAPSED DAYS: 7
RAD ONC MSQ LAST DATE: NORMAL
RAD ONC MSQ PRESCRIBED FRACTIONAL DOSE: 320 CGRAY
RAD ONC MSQ PRESCRIBED NUMBER OF FRACTIONS: 15
RAD ONC MSQ PRESCRIBED TECHNIQUE: NORMAL
RAD ONC MSQ PRESCRIBED TOTAL DOSE: 4800 CGRAY
RAD ONC MSQ PRESCRIPTION PATTERN COMMENT: NORMAL
RAD ONC MSQ START DATE: NORMAL
RAD ONC MSQ TREATMENT COURSE NUMBER: 1
RAD ONC MSQ TREATMENT SITE: NORMAL

## 2023-12-19 PROCEDURE — 77336 RADIATION PHYSICS CONSULT: CPT | Performed by: STUDENT IN AN ORGANIZED HEALTH CARE EDUCATION/TRAINING PROGRAM

## 2023-12-19 PROCEDURE — 77014 CHG CT GUIDANCE RADIATION THERAPY FLDS PLACEMENT: CPT | Performed by: STUDENT IN AN ORGANIZED HEALTH CARE EDUCATION/TRAINING PROGRAM

## 2023-12-19 PROCEDURE — 77385 HC INTENSITY-MODULATED RADIATION THERAPY (IMRT), SIMPLE: CPT | Performed by: STUDENT IN AN ORGANIZED HEALTH CARE EDUCATION/TRAINING PROGRAM

## 2023-12-20 ENCOUNTER — HOSPITAL ENCOUNTER (OUTPATIENT)
Dept: RADIATION ONCOLOGY | Facility: CLINIC | Age: 48
Setting detail: RADIATION/ONCOLOGY SERIES
Discharge: HOME | End: 2023-12-20
Payer: MEDICAID

## 2023-12-20 DIAGNOSIS — Z17.0 MALIGNANT NEOPLASM OF UPPER-OUTER QUADRANT OF LEFT BREAST IN FEMALE, ESTROGEN RECEPTOR POSITIVE (MULTI): Primary | ICD-10-CM

## 2023-12-20 DIAGNOSIS — C50.412 MALIGNANT NEOPLASM OF UPPER-OUTER QUADRANT OF LEFT BREAST IN FEMALE, ESTROGEN RECEPTOR POSITIVE (MULTI): Primary | ICD-10-CM

## 2023-12-20 PROCEDURE — 77385 HC INTENSITY-MODULATED RADIATION THERAPY (IMRT), SIMPLE: CPT | Performed by: STUDENT IN AN ORGANIZED HEALTH CARE EDUCATION/TRAINING PROGRAM

## 2023-12-20 PROCEDURE — 77014 CHG CT GUIDANCE RADIATION THERAPY FLDS PLACEMENT: CPT | Performed by: STUDENT IN AN ORGANIZED HEALTH CARE EDUCATION/TRAINING PROGRAM

## 2023-12-21 ENCOUNTER — HOSPITAL ENCOUNTER (OUTPATIENT)
Dept: RADIATION ONCOLOGY | Facility: CLINIC | Age: 48
Setting detail: RADIATION/ONCOLOGY SERIES
Discharge: HOME | End: 2023-12-21
Payer: MEDICAID

## 2023-12-21 ENCOUNTER — RADIATION ONCOLOGY OTV (OUTPATIENT)
Dept: RADIATION ONCOLOGY | Facility: CLINIC | Age: 48
End: 2023-12-21
Payer: MEDICAID

## 2023-12-21 VITALS
BODY MASS INDEX: 24.58 KG/M2 | DIASTOLIC BLOOD PRESSURE: 84 MMHG | SYSTOLIC BLOOD PRESSURE: 121 MMHG | OXYGEN SATURATION: 96 % | TEMPERATURE: 96.6 F | RESPIRATION RATE: 18 BRPM | HEART RATE: 76 BPM | WEIGHT: 120.81 LBS

## 2023-12-21 DIAGNOSIS — Z51.0 ENCOUNTER FOR ANTINEOPLASTIC RADIATION THERAPY: ICD-10-CM

## 2023-12-21 DIAGNOSIS — C50.412 MALIGNANT NEOPLASM OF UPPER-OUTER QUADRANT OF LEFT BREAST IN FEMALE, ESTROGEN RECEPTOR POSITIVE (MULTI): Primary | ICD-10-CM

## 2023-12-21 DIAGNOSIS — C77.3 SECONDARY AND UNSPECIFIED MALIGNANT NEOPLASM OF AXILLA AND UPPER LIMB LYMPH NODES (MULTI): ICD-10-CM

## 2023-12-21 DIAGNOSIS — Z17.0 MALIGNANT NEOPLASM OF UPPER-OUTER QUADRANT OF LEFT BREAST IN FEMALE, ESTROGEN RECEPTOR POSITIVE (MULTI): Primary | ICD-10-CM

## 2023-12-21 DIAGNOSIS — C50.812 MALIGNANT NEOPLASM OF OVERLAPPING SITES OF LEFT FEMALE BREAST (MULTI): ICD-10-CM

## 2023-12-21 LAB
RAD ONC MSQ ACTUAL FRACTIONS DELIVERED: 7
RAD ONC MSQ ACTUAL SESSION DELIVERED DOSE: 320 CGRAY
RAD ONC MSQ ACTUAL TOTAL DOSE: 2240 CGRAY
RAD ONC MSQ ELAPSED DAYS: 9
RAD ONC MSQ LAST DATE: NORMAL
RAD ONC MSQ PRESCRIBED FRACTIONAL DOSE: 320 CGRAY
RAD ONC MSQ PRESCRIBED NUMBER OF FRACTIONS: 15
RAD ONC MSQ PRESCRIBED TECHNIQUE: NORMAL
RAD ONC MSQ PRESCRIBED TOTAL DOSE: 4800 CGRAY
RAD ONC MSQ PRESCRIPTION PATTERN COMMENT: NORMAL
RAD ONC MSQ START DATE: NORMAL
RAD ONC MSQ TREATMENT COURSE NUMBER: 1
RAD ONC MSQ TREATMENT SITE: NORMAL

## 2023-12-21 PROCEDURE — 77014 CHG CT GUIDANCE RADIATION THERAPY FLDS PLACEMENT: CPT | Performed by: STUDENT IN AN ORGANIZED HEALTH CARE EDUCATION/TRAINING PROGRAM

## 2023-12-21 PROCEDURE — 77427 RADIATION TX MANAGEMENT X5: CPT | Performed by: STUDENT IN AN ORGANIZED HEALTH CARE EDUCATION/TRAINING PROGRAM

## 2023-12-21 PROCEDURE — 77385 HC INTENSITY-MODULATED RADIATION THERAPY (IMRT), SIMPLE: CPT | Performed by: STUDENT IN AN ORGANIZED HEALTH CARE EDUCATION/TRAINING PROGRAM

## 2023-12-21 RX ORDER — SUCRALFATE 1 G/1
1 TABLET ORAL
Qty: 120 TABLET | Refills: 11 | Status: SHIPPED | OUTPATIENT
Start: 2023-12-21 | End: 2024-12-20

## 2023-12-21 SDOH — ECONOMIC STABILITY: FOOD INSECURITY: WITHIN THE PAST 12 MONTHS, YOU WORRIED THAT YOUR FOOD WOULD RUN OUT BEFORE YOU GOT MONEY TO BUY MORE.: NEVER TRUE

## 2023-12-21 SDOH — ECONOMIC STABILITY: FOOD INSECURITY: WITHIN THE PAST 12 MONTHS, THE FOOD YOU BOUGHT JUST DIDN'T LAST AND YOU DIDN'T HAVE MONEY TO GET MORE.: NEVER TRUE

## 2023-12-21 ASSESSMENT — PAIN SCALES - GENERAL: PAINLEVEL: 0-NO PAIN

## 2023-12-21 NOTE — PROGRESS NOTES
Radiation Oncology On Treatment Visit    Patient Name:  Sapna Henriquez  MRN:  [id number]  :  1975    Referring Provider: No ref. 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             SUBJECTIVE: Tolerating well. Has some mild discomfort with swallowing rougher/drier foods; will send in Carafate mild dermatitis along the breast extending up towards the lateral neck, with no significant pain and pruritus.          OBJECTIVE:   Vital Signs:  /84   Pulse 76   Temp 35.9 °C (96.6 °F)   Resp 18   Wt 54.8 kg (120 lb 13 oz)   SpO2 96%   BMI 24.58 kg/m²    Pain Scale: The patient's current pain level was assessed.  They report currently having a pain of 0 out of 10.    Other Pertinent Findings:     Toxicity Assessment          2023    13:39 2023    10:04   Toxicity Assessment   Treatment Site Breast Breast   Anorexia Grade 1 Grade 1       still attempting to eat   Anxiety Grade 0 Grade 0   Dehydration Grade 1 Grade 1   Depression Grade 0 Grade 0   Dermatitis Radiation Grade 0  Grade 1       mid chest- using utterly smooth x1/day   Diarrhea Grade 0 Grade 0   Fatigue Grade 0 Grade 1       naps during day and rest early   Fibrosis Deep Connective Tissue Grade 0 Grade 0   Fracture Grade 0 Grade 0   Nausea Grade 0 Grade 0   Pain Grade 0 Grade 0   Treatment Related Secondary Malignancy Grade 0 Grade 0   Tumor Pain Grade 0 Grade 0   Vomiting Grade 0 Grade 0   Abdominal Pain Grade 0 Grade 0   Dysphagia Grade 0 Grade 1       started 12/20   Esophagitis Grade 0 Grade 1   Gastric Hemorrhage Grade 0 Grade 0   Mucositis Oral Grade 0 Grade 0   Dry Mouth Grade 0 Grade 1       encouraged oral intake   Breast Infection Grade 0 Grade 0   Seroma Grade 0 Grade 1   Joint Range of Motion Decreased Grade 0 Grade 0   Joint Range of Motion Decreased Lumbar Spine Grade 0 Grade 0   Brachial Plexopathy Grade 0 Grade 0   Breast Atrophy Grade 0 Grade 0   Breast Pain Grade 0 Grade 0   Nipple Deformity Grade 0 Grade 0   Pneumonitis Grade 0 Grade 0   Edema Limbs Grade 0 Grade 0   Lymphedema Grade 0 Grade 0   Thromboembolic Event Grade 0 Grade 0   Hot Flashes Grade 0 Grade 1        Assessment / Plan:  The patient is tolerating radiation therapy as anticipated.  Continue per current treatment plan.

## 2023-12-22 ENCOUNTER — HOSPITAL ENCOUNTER (OUTPATIENT)
Dept: RADIATION ONCOLOGY | Facility: CLINIC | Age: 48
Setting detail: RADIATION/ONCOLOGY SERIES
Discharge: HOME | End: 2023-12-22
Payer: MEDICAID

## 2023-12-22 PROCEDURE — 77014 CHG CT GUIDANCE RADIATION THERAPY FLDS PLACEMENT: CPT | Performed by: STUDENT IN AN ORGANIZED HEALTH CARE EDUCATION/TRAINING PROGRAM

## 2023-12-22 PROCEDURE — 77385 HC INTENSITY-MODULATED RADIATION THERAPY (IMRT), SIMPLE: CPT | Performed by: STUDENT IN AN ORGANIZED HEALTH CARE EDUCATION/TRAINING PROGRAM

## 2023-12-26 ENCOUNTER — HOSPITAL ENCOUNTER (OUTPATIENT)
Dept: RADIATION ONCOLOGY | Facility: CLINIC | Age: 48
Setting detail: RADIATION/ONCOLOGY SERIES
Discharge: HOME | End: 2023-12-26
Payer: MEDICAID

## 2023-12-26 DIAGNOSIS — C50.812 MALIGNANT NEOPLASM OF OVERLAPPING SITES OF LEFT FEMALE BREAST (MULTI): ICD-10-CM

## 2023-12-26 DIAGNOSIS — Z51.0 ENCOUNTER FOR ANTINEOPLASTIC RADIATION THERAPY: ICD-10-CM

## 2023-12-26 DIAGNOSIS — C77.3 SECONDARY AND UNSPECIFIED MALIGNANT NEOPLASM OF AXILLA AND UPPER LIMB LYMPH NODES (MULTI): ICD-10-CM

## 2023-12-26 LAB
RAD ONC MSQ ACTUAL FRACTIONS DELIVERED: 9
RAD ONC MSQ ACTUAL SESSION DELIVERED DOSE: 320 CGRAY
RAD ONC MSQ ACTUAL TOTAL DOSE: 2880 CGRAY
RAD ONC MSQ ELAPSED DAYS: 14
RAD ONC MSQ LAST DATE: NORMAL
RAD ONC MSQ PRESCRIBED FRACTIONAL DOSE: 320 CGRAY
RAD ONC MSQ PRESCRIBED NUMBER OF FRACTIONS: 15
RAD ONC MSQ PRESCRIBED TECHNIQUE: NORMAL
RAD ONC MSQ PRESCRIBED TOTAL DOSE: 4800 CGRAY
RAD ONC MSQ PRESCRIPTION PATTERN COMMENT: NORMAL
RAD ONC MSQ START DATE: NORMAL
RAD ONC MSQ TREATMENT COURSE NUMBER: 1
RAD ONC MSQ TREATMENT SITE: NORMAL

## 2023-12-26 PROCEDURE — 77014 CHG CT GUIDANCE RADIATION THERAPY FLDS PLACEMENT: CPT | Performed by: STUDENT IN AN ORGANIZED HEALTH CARE EDUCATION/TRAINING PROGRAM

## 2023-12-26 PROCEDURE — 77385 HC INTENSITY-MODULATED RADIATION THERAPY (IMRT), SIMPLE: CPT | Performed by: STUDENT IN AN ORGANIZED HEALTH CARE EDUCATION/TRAINING PROGRAM

## 2023-12-27 ENCOUNTER — HOSPITAL ENCOUNTER (OUTPATIENT)
Dept: RADIATION ONCOLOGY | Facility: CLINIC | Age: 48
Setting detail: RADIATION/ONCOLOGY SERIES
Discharge: HOME | End: 2023-12-27
Payer: MEDICAID

## 2023-12-27 DIAGNOSIS — C77.3 SECONDARY AND UNSPECIFIED MALIGNANT NEOPLASM OF AXILLA AND UPPER LIMB LYMPH NODES (MULTI): ICD-10-CM

## 2023-12-27 DIAGNOSIS — C50.812 MALIGNANT NEOPLASM OF OVERLAPPING SITES OF LEFT FEMALE BREAST (MULTI): ICD-10-CM

## 2023-12-27 DIAGNOSIS — Z51.0 ENCOUNTER FOR ANTINEOPLASTIC RADIATION THERAPY: ICD-10-CM

## 2023-12-27 LAB
RAD ONC MSQ ACTUAL FRACTIONS DELIVERED: 10
RAD ONC MSQ ACTUAL SESSION DELIVERED DOSE: 320 CGRAY
RAD ONC MSQ ACTUAL TOTAL DOSE: 3200 CGRAY
RAD ONC MSQ ELAPSED DAYS: 15
RAD ONC MSQ LAST DATE: NORMAL
RAD ONC MSQ PRESCRIBED FRACTIONAL DOSE: 320 CGRAY
RAD ONC MSQ PRESCRIBED NUMBER OF FRACTIONS: 15
RAD ONC MSQ PRESCRIBED TECHNIQUE: NORMAL
RAD ONC MSQ PRESCRIBED TOTAL DOSE: 4800 CGRAY
RAD ONC MSQ PRESCRIPTION PATTERN COMMENT: NORMAL
RAD ONC MSQ START DATE: NORMAL
RAD ONC MSQ TREATMENT COURSE NUMBER: 1
RAD ONC MSQ TREATMENT SITE: NORMAL

## 2023-12-27 PROCEDURE — 77014 CHG CT GUIDANCE RADIATION THERAPY FLDS PLACEMENT: CPT | Performed by: STUDENT IN AN ORGANIZED HEALTH CARE EDUCATION/TRAINING PROGRAM

## 2023-12-27 PROCEDURE — 77385 HC INTENSITY-MODULATED RADIATION THERAPY (IMRT), SIMPLE: CPT | Performed by: STUDENT IN AN ORGANIZED HEALTH CARE EDUCATION/TRAINING PROGRAM

## 2023-12-28 ENCOUNTER — RADIATION ONCOLOGY OTV (OUTPATIENT)
Dept: RADIATION ONCOLOGY | Facility: CLINIC | Age: 48
End: 2023-12-28
Payer: MEDICAID

## 2023-12-28 ENCOUNTER — HOSPITAL ENCOUNTER (OUTPATIENT)
Dept: RADIATION ONCOLOGY | Facility: CLINIC | Age: 48
Setting detail: RADIATION/ONCOLOGY SERIES
Discharge: HOME | End: 2023-12-28
Payer: MEDICAID

## 2023-12-28 VITALS
RESPIRATION RATE: 18 BRPM | DIASTOLIC BLOOD PRESSURE: 80 MMHG | HEART RATE: 69 BPM | TEMPERATURE: 96.6 F | WEIGHT: 118.39 LBS | BODY MASS INDEX: 24.09 KG/M2 | SYSTOLIC BLOOD PRESSURE: 115 MMHG | OXYGEN SATURATION: 98 %

## 2023-12-28 DIAGNOSIS — Z51.0 ENCOUNTER FOR ANTINEOPLASTIC RADIATION THERAPY: ICD-10-CM

## 2023-12-28 DIAGNOSIS — C77.3 SECONDARY AND UNSPECIFIED MALIGNANT NEOPLASM OF AXILLA AND UPPER LIMB LYMPH NODES (MULTI): ICD-10-CM

## 2023-12-28 DIAGNOSIS — C50.812 MALIGNANT NEOPLASM OF OVERLAPPING SITES OF LEFT FEMALE BREAST (MULTI): ICD-10-CM

## 2023-12-28 DIAGNOSIS — Z17.0 MALIGNANT NEOPLASM OF UPPER-OUTER QUADRANT OF LEFT BREAST IN FEMALE, ESTROGEN RECEPTOR POSITIVE (MULTI): Primary | ICD-10-CM

## 2023-12-28 DIAGNOSIS — C50.412 MALIGNANT NEOPLASM OF UPPER-OUTER QUADRANT OF LEFT BREAST IN FEMALE, ESTROGEN RECEPTOR POSITIVE (MULTI): Primary | ICD-10-CM

## 2023-12-28 LAB
RAD ONC MSQ ACTUAL FRACTIONS DELIVERED: 11
RAD ONC MSQ ACTUAL SESSION DELIVERED DOSE: 320 CGRAY
RAD ONC MSQ ACTUAL TOTAL DOSE: 3520 CGRAY
RAD ONC MSQ ELAPSED DAYS: 16
RAD ONC MSQ LAST DATE: NORMAL
RAD ONC MSQ PRESCRIBED FRACTIONAL DOSE: 320 CGRAY
RAD ONC MSQ PRESCRIBED NUMBER OF FRACTIONS: 15
RAD ONC MSQ PRESCRIBED TECHNIQUE: NORMAL
RAD ONC MSQ PRESCRIBED TOTAL DOSE: 4800 CGRAY
RAD ONC MSQ PRESCRIPTION PATTERN COMMENT: NORMAL
RAD ONC MSQ START DATE: NORMAL
RAD ONC MSQ TREATMENT COURSE NUMBER: 1
RAD ONC MSQ TREATMENT SITE: NORMAL

## 2023-12-28 PROCEDURE — 77385 HC INTENSITY-MODULATED RADIATION THERAPY (IMRT), SIMPLE: CPT | Performed by: STUDENT IN AN ORGANIZED HEALTH CARE EDUCATION/TRAINING PROGRAM

## 2023-12-28 PROCEDURE — 77427 RADIATION TX MANAGEMENT X5: CPT | Performed by: STUDENT IN AN ORGANIZED HEALTH CARE EDUCATION/TRAINING PROGRAM

## 2023-12-28 PROCEDURE — 77014 CHG CT GUIDANCE RADIATION THERAPY FLDS PLACEMENT: CPT | Performed by: STUDENT IN AN ORGANIZED HEALTH CARE EDUCATION/TRAINING PROGRAM

## 2023-12-28 RX ORDER — LIDOCAINE HYDROCHLORIDE 20 MG/ML
10 SOLUTION OROPHARYNGEAL 3 TIMES DAILY PRN
Qty: 100 ML | Refills: 2 | Status: SHIPPED | OUTPATIENT
Start: 2023-12-28 | End: 2024-01-27

## 2023-12-28 ASSESSMENT — PAIN SCALES - GENERAL: PAINLEVEL: 4

## 2023-12-28 NOTE — PROGRESS NOTES
Radiation Oncology On Treatment Visit    Patient Name:  Sapna Henriquez  MRN:  [id number]  :  1975    Referring Provider: No ref. 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             SUBJECTIVE: Tolerating as expected. Still has esophagitis symptoms; had relief with Carafate, though has not been taking consistently. Discussed more routine use of Carafate, and we will also send in viscous lidocaine. Stable mild radiation dermatitis.      OBJECTIVE:   Vital Signs:  /80 (BP Location: Right arm, Patient Position: Sitting)   Pulse 69   Temp 35.9 °C (96.6 °F)   Resp 18   Wt 53.7 kg (118 lb 6.2 oz)   SpO2 98%   BMI 24.09 kg/m²    Pain Scale: The patient's current pain level was assessed.  They report currently having a pain of 4 out of 10.    Other Pertinent Findings:     Toxicity Assessment          2023    13:39 2023    10:04 2023    10:48   Toxicity Assessment   Treatment Site Breast Breast Breast   Anorexia Grade 1 Grade 1       still attempting to eat Grade 1    Anxiety Grade 0 Grade 0 Grade 0   Dehydration Grade 1 Grade 1 Grade 0   Depression Grade 0 Grade 0 Grade 0   Dermatitis Radiation Grade 0 Grade 1       mid chest- using utterly smooth x1/day Grade 1   Diarrhea Grade 0 Grade 0 Grade 0   Fatigue Grade 0 Grade 1       naps during day and rest early Grade 0   Fibrosis Deep Connective Tissue Grade 0 Grade 0 Grade 0   Fracture Grade 0 Grade 0 Grade 0   Nausea Grade 0 Grade 0 Grade 0   Pain Grade 0 Grade 0 Grade 1   Treatment Related Secondary Malignancy Grade 0 Grade 0 Grade 0   Tumor Pain Grade 0 Grade 0 Grade 0   Vomiting Grade 0 Grade 0 Grade 0   Abdominal Pain Grade 0 Grade 0 Grade 0   Dysphagia Grade 0 Grade 1       started 12/20 Grade 1       taking carafate   Esophagitis Grade 0 Grade 1 Grade 0   Gastric Hemorrhage Grade 0 Grade 0 Grade 0   Mucositis Oral Grade 0 Grade 0 Grade 0   Dry Mouth Grade 0 Grade 1       encouraged oral intake Grade 0   Breast Infection Grade 0 Grade 0 Grade 0   Seroma Grade 0 Grade 1 Grade 0   Joint Range of Motion Decreased Grade 0 Grade 0 Grade 0   Joint Range of Motion Decreased Lumbar Spine Grade 0 Grade 0 Grade 0   Brachial Plexopathy Grade 0 Grade 0 Grade 0   Breast Atrophy Grade 0 Grade 0 Grade 0   Breast Pain Grade 0 Grade 0 Grade 0   Nipple Deformity Grade 0 Grade 0 Grade 0   Pneumonitis Grade 0 Grade 0 Grade 0   Edema Limbs Grade 0 Grade 0 Grade 0   Lymphedema Grade 0 Grade 0 Grade 0   Thromboembolic Event Grade 0 Grade 0 Grade 0   Hot Flashes Grade 0 Grade 1 Grade 0        Assessment / Plan:  The patient is tolerating radiation therapy as anticipated.  Continue per current treatment plan.         Home

## 2023-12-29 ENCOUNTER — HOSPITAL ENCOUNTER (OUTPATIENT)
Dept: RADIATION ONCOLOGY | Facility: CLINIC | Age: 48
Setting detail: RADIATION/ONCOLOGY SERIES
Discharge: HOME | End: 2023-12-29
Payer: MEDICAID

## 2023-12-29 DIAGNOSIS — C50.812 MALIGNANT NEOPLASM OF OVERLAPPING SITES OF LEFT FEMALE BREAST (MULTI): ICD-10-CM

## 2023-12-29 DIAGNOSIS — C77.3 SECONDARY AND UNSPECIFIED MALIGNANT NEOPLASM OF AXILLA AND UPPER LIMB LYMPH NODES (MULTI): ICD-10-CM

## 2023-12-29 DIAGNOSIS — Z51.0 ENCOUNTER FOR ANTINEOPLASTIC RADIATION THERAPY: ICD-10-CM

## 2023-12-29 LAB
RAD ONC MSQ ACTUAL FRACTIONS DELIVERED: 12
RAD ONC MSQ ACTUAL SESSION DELIVERED DOSE: 320 CGRAY
RAD ONC MSQ ACTUAL TOTAL DOSE: 3840 CGRAY
RAD ONC MSQ ELAPSED DAYS: 17
RAD ONC MSQ LAST DATE: NORMAL
RAD ONC MSQ PRESCRIBED FRACTIONAL DOSE: 320 CGRAY
RAD ONC MSQ PRESCRIBED NUMBER OF FRACTIONS: 15
RAD ONC MSQ PRESCRIBED TECHNIQUE: NORMAL
RAD ONC MSQ PRESCRIBED TOTAL DOSE: 4800 CGRAY
RAD ONC MSQ PRESCRIPTION PATTERN COMMENT: NORMAL
RAD ONC MSQ START DATE: NORMAL
RAD ONC MSQ TREATMENT COURSE NUMBER: 1
RAD ONC MSQ TREATMENT SITE: NORMAL

## 2023-12-29 PROCEDURE — 77385 HC INTENSITY-MODULATED RADIATION THERAPY (IMRT), SIMPLE: CPT | Performed by: STUDENT IN AN ORGANIZED HEALTH CARE EDUCATION/TRAINING PROGRAM

## 2023-12-29 PROCEDURE — 77014 CHG CT GUIDANCE RADIATION THERAPY FLDS PLACEMENT: CPT | Performed by: STUDENT IN AN ORGANIZED HEALTH CARE EDUCATION/TRAINING PROGRAM

## 2024-01-02 ENCOUNTER — HOSPITAL ENCOUNTER (OUTPATIENT)
Dept: RADIATION ONCOLOGY | Facility: CLINIC | Age: 49
Setting detail: RADIATION/ONCOLOGY SERIES
Discharge: HOME | End: 2024-01-02
Payer: MEDICAID

## 2024-01-02 DIAGNOSIS — Z51.0 ENCOUNTER FOR ANTINEOPLASTIC RADIATION THERAPY: ICD-10-CM

## 2024-01-02 DIAGNOSIS — C50.812 MALIGNANT NEOPLASM OF OVERLAPPING SITES OF LEFT FEMALE BREAST (MULTI): ICD-10-CM

## 2024-01-02 DIAGNOSIS — C77.3 SECONDARY AND UNSPECIFIED MALIGNANT NEOPLASM OF AXILLA AND UPPER LIMB LYMPH NODES (MULTI): ICD-10-CM

## 2024-01-02 LAB
RAD ONC MSQ ACTUAL FRACTIONS DELIVERED: 13
RAD ONC MSQ ACTUAL SESSION DELIVERED DOSE: 320 CGRAY
RAD ONC MSQ ACTUAL TOTAL DOSE: 4160 CGRAY
RAD ONC MSQ ELAPSED DAYS: 21
RAD ONC MSQ LAST DATE: NORMAL
RAD ONC MSQ PRESCRIBED FRACTIONAL DOSE: 320 CGRAY
RAD ONC MSQ PRESCRIBED NUMBER OF FRACTIONS: 15
RAD ONC MSQ PRESCRIBED TECHNIQUE: NORMAL
RAD ONC MSQ PRESCRIBED TOTAL DOSE: 4800 CGRAY
RAD ONC MSQ PRESCRIPTION PATTERN COMMENT: NORMAL
RAD ONC MSQ START DATE: NORMAL
RAD ONC MSQ TREATMENT COURSE NUMBER: 1
RAD ONC MSQ TREATMENT SITE: NORMAL

## 2024-01-02 PROCEDURE — 77385 HC INTENSITY-MODULATED RADIATION THERAPY (IMRT), SIMPLE: CPT | Performed by: STUDENT IN AN ORGANIZED HEALTH CARE EDUCATION/TRAINING PROGRAM

## 2024-01-02 PROCEDURE — 77336 RADIATION PHYSICS CONSULT: CPT | Performed by: STUDENT IN AN ORGANIZED HEALTH CARE EDUCATION/TRAINING PROGRAM

## 2024-01-02 PROCEDURE — 77014 CHG CT GUIDANCE RADIATION THERAPY FLDS PLACEMENT: CPT | Performed by: STUDENT IN AN ORGANIZED HEALTH CARE EDUCATION/TRAINING PROGRAM

## 2024-01-03 ENCOUNTER — CLINICAL SUPPORT (OUTPATIENT)
Dept: NUTRITION | Facility: HOSPITAL | Age: 49
End: 2024-01-03
Payer: MEDICAID

## 2024-01-03 ENCOUNTER — HOSPITAL ENCOUNTER (OUTPATIENT)
Dept: RADIATION ONCOLOGY | Facility: CLINIC | Age: 49
Setting detail: RADIATION/ONCOLOGY SERIES
Discharge: HOME | End: 2024-01-03
Payer: MEDICAID

## 2024-01-03 DIAGNOSIS — Z17.0 MALIGNANT NEOPLASM OF UPPER-OUTER QUADRANT OF LEFT BREAST IN FEMALE, ESTROGEN RECEPTOR POSITIVE (MULTI): ICD-10-CM

## 2024-01-03 DIAGNOSIS — C77.3 SECONDARY AND UNSPECIFIED MALIGNANT NEOPLASM OF AXILLA AND UPPER LIMB LYMPH NODES (MULTI): ICD-10-CM

## 2024-01-03 DIAGNOSIS — C50.812 MALIGNANT NEOPLASM OF OVERLAPPING SITES OF LEFT FEMALE BREAST (MULTI): ICD-10-CM

## 2024-01-03 DIAGNOSIS — C50.412 MALIGNANT NEOPLASM OF UPPER-OUTER QUADRANT OF LEFT BREAST IN FEMALE, ESTROGEN RECEPTOR POSITIVE (MULTI): ICD-10-CM

## 2024-01-03 DIAGNOSIS — Z51.0 ENCOUNTER FOR ANTINEOPLASTIC RADIATION THERAPY: ICD-10-CM

## 2024-01-03 LAB
RAD ONC MSQ ACTUAL FRACTIONS DELIVERED: 14
RAD ONC MSQ ACTUAL SESSION DELIVERED DOSE: 320 CGRAY
RAD ONC MSQ ACTUAL TOTAL DOSE: 4480 CGRAY
RAD ONC MSQ ELAPSED DAYS: 22
RAD ONC MSQ LAST DATE: NORMAL
RAD ONC MSQ PRESCRIBED FRACTIONAL DOSE: 320 CGRAY
RAD ONC MSQ PRESCRIBED NUMBER OF FRACTIONS: 15
RAD ONC MSQ PRESCRIBED TECHNIQUE: NORMAL
RAD ONC MSQ PRESCRIBED TOTAL DOSE: 4800 CGRAY
RAD ONC MSQ PRESCRIPTION PATTERN COMMENT: NORMAL
RAD ONC MSQ START DATE: NORMAL
RAD ONC MSQ TREATMENT COURSE NUMBER: 1
RAD ONC MSQ TREATMENT SITE: NORMAL

## 2024-01-03 PROCEDURE — 77014 CHG CT GUIDANCE RADIATION THERAPY FLDS PLACEMENT: CPT | Performed by: STUDENT IN AN ORGANIZED HEALTH CARE EDUCATION/TRAINING PROGRAM

## 2024-01-03 PROCEDURE — 77385 HC INTENSITY-MODULATED RADIATION THERAPY (IMRT), SIMPLE: CPT | Performed by: STUDENT IN AN ORGANIZED HEALTH CARE EDUCATION/TRAINING PROGRAM

## 2024-01-03 NOTE — PROGRESS NOTES
[employer]  Diet Recommendation 1: Healthy Eating  Diet Recommendation 2: Protein, High  Diet Recommendation 3: Calories, High  Other Diet Recommendations: Soft Foods (current swallowing difficulty d/t radiation.)  Nutrition Goals Stated: Gain weight. 8lbs unintentional weight loss.  Household Size: 1 Family Member  Interventions: Referral Number: 1st 6 Mo Referral 6 Mos  Interventions: Visit Number: 1 of 6 Visits - Max 6 Visits/Referral Each 6 Mo Period  Education Today: Weight Gain (Weight loss prevention, soft foods)  Follow Up Notes for Future Visits: May be able to eat regular texture food by next appointment.  Grains: 0-25% Whole  Fruit: 0-25% Fresh  Vegetables: % Fresh  Proteins: 1-2 Plant-based Items  Dairy: 0-25% Lowfat  Initials of RD Assisting Today:

## 2024-01-04 ENCOUNTER — RADIATION ONCOLOGY OTV (OUTPATIENT)
Dept: RADIATION ONCOLOGY | Facility: CLINIC | Age: 49
End: 2024-01-04
Payer: MEDICAID

## 2024-01-04 ENCOUNTER — HOSPITAL ENCOUNTER (OUTPATIENT)
Dept: RADIATION ONCOLOGY | Facility: CLINIC | Age: 49
Setting detail: RADIATION/ONCOLOGY SERIES
Discharge: HOME | End: 2024-01-04
Payer: MEDICAID

## 2024-01-04 VITALS
SYSTOLIC BLOOD PRESSURE: 117 MMHG | DIASTOLIC BLOOD PRESSURE: 76 MMHG | TEMPERATURE: 96.4 F | WEIGHT: 117.5 LBS | OXYGEN SATURATION: 97 % | RESPIRATION RATE: 18 BRPM | BODY MASS INDEX: 23.91 KG/M2 | HEART RATE: 66 BPM

## 2024-01-04 DIAGNOSIS — C77.3 SECONDARY AND UNSPECIFIED MALIGNANT NEOPLASM OF AXILLA AND UPPER LIMB LYMPH NODES (MULTI): ICD-10-CM

## 2024-01-04 DIAGNOSIS — Z51.0 ENCOUNTER FOR ANTINEOPLASTIC RADIATION THERAPY: ICD-10-CM

## 2024-01-04 DIAGNOSIS — C50.812 MALIGNANT NEOPLASM OF OVERLAPPING SITES OF LEFT FEMALE BREAST (MULTI): ICD-10-CM

## 2024-01-04 LAB
RAD ONC MSQ ACTUAL FRACTIONS DELIVERED: 15
RAD ONC MSQ ACTUAL SESSION DELIVERED DOSE: 320 CGRAY
RAD ONC MSQ ACTUAL TOTAL DOSE: 4800 CGRAY
RAD ONC MSQ ELAPSED DAYS: 23
RAD ONC MSQ LAST DATE: NORMAL
RAD ONC MSQ PRESCRIBED FRACTIONAL DOSE: 320 CGRAY
RAD ONC MSQ PRESCRIBED NUMBER OF FRACTIONS: 15
RAD ONC MSQ PRESCRIBED TECHNIQUE: NORMAL
RAD ONC MSQ PRESCRIBED TOTAL DOSE: 4800 CGRAY
RAD ONC MSQ PRESCRIPTION PATTERN COMMENT: NORMAL
RAD ONC MSQ START DATE: NORMAL
RAD ONC MSQ TREATMENT COURSE NUMBER: 1
RAD ONC MSQ TREATMENT SITE: NORMAL

## 2024-01-04 PROCEDURE — 77385 HC INTENSITY-MODULATED RADIATION THERAPY (IMRT), SIMPLE: CPT | Performed by: STUDENT IN AN ORGANIZED HEALTH CARE EDUCATION/TRAINING PROGRAM

## 2024-01-04 PROCEDURE — 77014 CHG CT GUIDANCE RADIATION THERAPY FLDS PLACEMENT: CPT | Performed by: STUDENT IN AN ORGANIZED HEALTH CARE EDUCATION/TRAINING PROGRAM

## 2024-01-04 ASSESSMENT — PAIN SCALES - GENERAL: PAINLEVEL: 0-NO PAIN

## 2024-01-04 NOTE — PROGRESS NOTES
Radiation Oncology On Treatment Visit    Patient Name:  Sapna Henriquez  MRN:  [id number]  :  1975    Referring Provider: No ref. provider found  Primary Care Provider: José Luis Zambrano DO  Care Team: Patient Care Team:  José Luis Zambrano DO as PCP - General  Stew Bustos MD as Primary Care Provider  Torsten Best MD as Consulting Physician (Hematology and Oncology)    Date of Service: 2024     Diagnosis:   Specialty Problems          Radiation Oncology Problems    Carcinoma of left breast metastatic to axillary lymph node (CMS/HCC)        Malignant neoplasm of upper-outer quadrant of left breast, estrogen receptor positive (CMS/HCC)        Neoplasm of left breast, regional lymph node staging category N3b: metastasis in ipsilateral internal mammary lymph node and axillary lymph node (CMS/HCC)         Treatment Summary:  Radiation Treatments       Active   No active radiation treatments to show.     Completed   L breast_RNI (Started on 2023)   Most recent fraction: 320 cGy given on 2024   Total given: 4,800 cGy / 4,800 cGy  (15 of 15 fractions)   Elapsed Days: 23   Technique: VMAT                   SUBJECTIVE: Completes treatment today. Increased dermatitis with more brisk erythema particularly along the medial/upper chest without desquamation. Notes increased pruritus, and discussed starting hydrocortisone. Overall stable esophagitis, managed with Carafate with lidocaine. Discussed continuing medication regimen until symptoms resolve within the next 1 to 2 weeks.      OBJECTIVE:   Vital Signs:  /76   Pulse 66   Temp 35.8 °C (96.4 °F)   Resp 18   Wt 53.3 kg (117 lb 8.1 oz)   SpO2 97%   BMI 23.91 kg/m²    Pain Scale: The patient's current pain level was assessed.  They report currently having a pain of 0 out of 10.    Other Pertinent Findings:   Daily Weight  24 : 53.3 kg (117 lb 8.1 oz)  23 : 53.7 kg (118 lb 6.2 oz)  23 : 54.8 kg (120 lb 13 oz)  23 : 54.1 kg (119 lb 2.5  oz)  11/03/23 : 54.7 kg (120 lb 9.5 oz)       Toxicity Assessment          12/13/2023    13:39 12/21/2023    10:04 12/28/2023    10:48 1/4/2024    10:41   Toxicity Assessment   Treatment Site Breast Breast Breast Breast   Anorexia Grade 1 Grade 1       still attempting to eat Grade 1 Grade 1       only taking soft foods   Anxiety Grade 0 Grade 0 Grade 0 Grade 0   Dehydration Grade 1 Grade 1 Grade 0 Grade 1   Depression Grade 0 Grade 0 Grade 0 Grade 0   Dermatitis Radiation Grade 0 Grade 1       mid chest- using utterly smooth x1/day Grade 1 Grade 2       Using 1% Hydrocortisone cream and Skintegrity   Diarrhea Grade 0 Grade 0 Grade 0 Grade 0   Fatigue Grade 0 Grade 1       naps during day and rest early Grade 0 Grade 1       naps during the day and goes to bed early   Fibrosis Deep Connective Tissue Grade 0 Grade 0 Grade 0 Grade 0   Fracture Grade 0 Grade 0 Grade 0 Grade 0   Nausea Grade 0 Grade 0 Grade 0 Grade 0   Pain Grade 0 Grade 0 Grade 1 Grade 0   Treatment Related Secondary Malignancy Grade 0 Grade 0 Grade 0 Grade 0   Tumor Pain Grade 0 Grade 0 Grade 0 Grade 0   Vomiting Grade 0 Grade 0 Grade 0 Grade 0   Abdominal Pain Grade 0 Grade 0 Grade 0    Dysphagia Grade 0 Grade 1       started 12/20 Grade 1       taking carafate    Esophagitis Grade 0 Grade 1 Grade 0    Gastric Hemorrhage Grade 0 Grade 0 Grade 0    Mucositis Oral Grade 0 Grade 0 Grade 0    Dry Mouth Grade 0 Grade 1       encouraged oral intake Grade 0 Grade 0   Breast Infection Grade 0 Grade 0 Grade 0 Grade 0   Seroma Grade 0 Grade 1 Grade 0 Grade 1   Joint Range of Motion Decreased Grade 0 Grade 0 Grade 0 Grade 0   Joint Range of Motion Decreased Lumbar Spine Grade 0 Grade 0 Grade 0 Grade 0   Brachial Plexopathy Grade 0 Grade 0 Grade 0 Grade 0   Breast Atrophy Grade 0 Grade 0 Grade 0 Grade 0   Breast Pain Grade 0 Grade 0 Grade 0 Grade 0   Nipple Deformity Grade 0 Grade 0 Grade 0 Grade 0   Pneumonitis Grade 0 Grade 0 Grade 0 Grade 0   Edema Limbs  Grade 0 Grade 0 Grade 0 Grade 0   Lymphedema Grade 0 Grade 0 Grade 0 Grade 0   Thromboembolic Event Grade 0 Grade 0 Grade 0 Grade 0   Hot Flashes Grade 0 Grade 1 Grade 0 Grade 1        Assessment / Plan:  The patient is tolerating radiation therapy as anticipated.  Continue per current treatment plan.

## 2024-01-05 ENCOUNTER — APPOINTMENT (OUTPATIENT)
Dept: HEMATOLOGY/ONCOLOGY | Facility: HOSPITAL | Age: 49
End: 2024-01-05
Payer: OTHER GOVERNMENT

## 2024-01-05 NOTE — PROGRESS NOTES
Patient completed 15 fractions of radiation on 1/4/24. Patient given and reviewed What you need to know after radiation. We reviewed again side effect of radiation and how the effects can linger for 1-2 weeks. Patient instructed to call with questions or concerns. Patient to follow up with Mj Calles on 2/16/24. Patient verbalizes understanding with verbal teach back. Lila Ramírez MSN, RN, OCN

## 2024-01-05 NOTE — PROGRESS NOTES
Radiation Oncology Treatment Summary    Patient Name:  Sapna Henriquez  MRN:  [id number]  :  1975    Referring Provider: Carol Rojas MD  Primary Care Provider: José Luis Zambrano DO    Brief History: Sapna Henriquez is a 48 y.o. female with Malignant neoplasm of upper-outer quadrant of left breast, estrogen receptor positive (CMS/HCC), Clinical: Stage IIIB (cT2, cN3b, cM0, G3, ER+, ND+, HER2: Equivocal)  Malignant neoplasm of upper-outer quadrant of left breast, estrogen receptor positive (CMS/HCC), Pathologic: No Stage Recommended (ypT0, pN0, cM0).  The patient completed radiotherapy as outlined below. Tolerated relatively well with brisk radiation dermatitis without desquamation, managed with topical skin cream. Mild esophagitis, managed with Carafate and lidocaine.    Radiation Treatment Summary:    Radiation Treatments       Active   No active radiation treatments to show.     Completed   L breast_RNI (Started on 2023)   Most recent fraction: 320 cGy given on 2024   Total given: 4,800 cGy / 4,800 cGy  (15 of 15 fractions)   Elapsed Days: 23   Technique: VMAT                         Please see the patient's [facility] chart for further details regarding the radiation plan, including beam energy.    Concurrent Chemotherapy:  Treatment Plans       No treatment plans exist          CTCAE Toxicity Overview:   Toxicity Assessment          2023    10:48 2024    10:41   Toxicity Assessment   Treatment Site Breast Breast   Anorexia Grade 1 Grade 1       only taking soft foods   Anxiety Grade 0 Grade 0   Dehydration Grade 0 Grade 1   Depression Grade 0 Grade 0   Dermatitis Radiation Grade 1 Grade 2       Using 1% Hydrocortisone cream and Skintegrity   Diarrhea Grade 0 Grade 0   Fatigue Grade 0 Grade 1       naps during the day and goes to bed early   Fibrosis Deep Connective Tissue Grade 0 Grade 0   Fracture Grade 0 Grade 0   Nausea Grade 0 Grade 0   Pain Grade 1 Grade 0   Treatment Related Secondary Malignancy  Grade 0 Grade 0   Tumor Pain Grade 0 Grade 0   Vomiting Grade 0 Grade 0   Abdominal Pain Grade 0    Dysphagia Grade 1       taking carafate    Esophagitis Grade 0    Gastric Hemorrhage Grade 0    Mucositis Oral Grade 0    Dry Mouth Grade 0 Grade 0   Breast Infection Grade 0 Grade 0   Seroma Grade 0 Grade 1   Joint Range of Motion Decreased Grade 0 Grade 0   Joint Range of Motion Decreased Lumbar Spine Grade 0 Grade 0   Brachial Plexopathy Grade 0 Grade 0   Breast Atrophy Grade 0 Grade 0   Breast Pain Grade 0 Grade 0   Nipple Deformity Grade 0 Grade 0   Pneumonitis Grade 0 Grade 0   Edema Limbs Grade 0 Grade 0   Lymphedema Grade 0 Grade 0   Thromboembolic Event Grade 0 Grade 0   Hot Flashes Grade 0 Grade 1     Patient Disposition: Patient to follow up with Dr. Calles on 2/16/24.

## 2024-01-19 ENCOUNTER — OFFICE VISIT (OUTPATIENT)
Dept: HEMATOLOGY/ONCOLOGY | Facility: HOSPITAL | Age: 49
End: 2024-01-19
Payer: MEDICAID

## 2024-01-19 VITALS
HEART RATE: 58 BPM | WEIGHT: 116.84 LBS | HEIGHT: 59 IN | DIASTOLIC BLOOD PRESSURE: 76 MMHG | TEMPERATURE: 97.2 F | OXYGEN SATURATION: 98 % | SYSTOLIC BLOOD PRESSURE: 113 MMHG | BODY MASS INDEX: 23.56 KG/M2 | RESPIRATION RATE: 16 BRPM

## 2024-01-19 DIAGNOSIS — Z17.0 MALIGNANT NEOPLASM OF UPPER-OUTER QUADRANT OF LEFT BREAST IN FEMALE, ESTROGEN RECEPTOR POSITIVE (MULTI): ICD-10-CM

## 2024-01-19 DIAGNOSIS — C50.412 MALIGNANT NEOPLASM OF UPPER-OUTER QUADRANT OF LEFT BREAST IN FEMALE, ESTROGEN RECEPTOR POSITIVE (MULTI): ICD-10-CM

## 2024-01-19 PROCEDURE — 99214 OFFICE O/P EST MOD 30 MIN: CPT | Performed by: STUDENT IN AN ORGANIZED HEALTH CARE EDUCATION/TRAINING PROGRAM

## 2024-01-19 PROCEDURE — 1036F TOBACCO NON-USER: CPT | Performed by: STUDENT IN AN ORGANIZED HEALTH CARE EDUCATION/TRAINING PROGRAM

## 2024-01-19 RX ORDER — ANASTROZOLE 1 MG/1
1 TABLET ORAL DAILY
Qty: 90 TABLET | Refills: 2 | Status: SHIPPED | OUTPATIENT
Start: 2024-01-19 | End: 2024-05-09 | Stop reason: SDUPTHER

## 2024-01-19 ASSESSMENT — PAIN SCALES - GENERAL: PAINLEVEL: 0-NO PAIN

## 2024-01-19 NOTE — PROGRESS NOTES
Patient ID: Sapna Henriquez is a 48 y.o. female.    The patient presents to clinic today for her history of breast cancer.     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, estrogen receptor positive (CMS/HCC)  Staging form: Breast, AJCC 8th Edition  - Clinical stage from 10/25/2023: Stage IIIB (cT2, cN3b, cM0, G3, ER+, OH+, HER2: Equivocal) - Signed by Starla Calles DO on 10/25/2023  - Pathologic stage from 10/25/2023: No Stage Recommended (ypT0, pN0, cM0) - Signed by Starla Calles DO on 10/25/2023        Diagnostic/Therapeutic History:    On 12/21/2022 she had diagnostic mammogram that was done that showed heterogeneously dense breast tissue with a focal symptom marker overlying the outer left breast  at anterior depth, adjacent to the marker is an irregular increased density mass with architectural distortion.  BI-RADS Category 5   Ultrasound: This corresponds with an area of the lump localized at the 3 o'clock position of the left breast 4 cm from the nipple there was a 2.2 x 2.2 x 1.1 cm lobulated irregularly irregular hypoechoic mass with multiple abnormal appearing left axillary  lymph nodes.      On 1/10/2023 she had left breast ultrasound-guided core needle biopsy showed invasive ductal carcinoma, grade 2-3, DCIS, high nuclear grade ER 95%, OH 70%, HER2 2+, nonamplified by dual ROMERO MammaPrint index: -0.154, high risk luminal B left axilla ultrasound-guided  core needle biopsy showed metastatic carcinoma       CT C/A/P showed focal breast tissue nodularity and prominent left axillary LN in addition to left Internal mammary LN with no evidence of distant disease; no evidence of bone mets on bone scan     8/11/2023: completed neoadjuvant chemotherapy with ddAC followed by weekly taxol    09/20/2023: Dr Rojas Performed a left PM and SLNB (0/3) that showed a complete pathological response     History of Present Illness (HPI)/Interval History:  Doing well, recovering well from surgery  Does  have hot flushes, does have left breast radiation changes    She denies any fevers or chills.    She denies any chest pain or breathing issues.     She denies any vision changes or headache issues, dizziness, loss of balance,  and no falls.     She denies any new or unexplained bone aches or pains.    She denies any skin lesions or masses, hair loss, nail changes, or oral sores.    She reports a normal appetite and normal bowel movements. Her weight is stable.     PMH:  No PMH , horseshoe kidney     PSH: tubal ligation,      Allergies: NKDA     No meds     Reproductive hx: menarche 14, 9 years ago no menses since ligation, , 2nd one who got , OCP x couple years, No HRT     Family hx: mom at age 41 with breast cancer, grandma breast cancer and ovarian cancer  at 73, greaatgrandma breast cancer  at 58, uncle for mom’s side colon cancer, grandfather on mother side has skin cancer     Social hx: non smoker, alcohol twice a week          Review of Systems:  14-point ROS otherwise negative, as per HPI.    No past medical history on file.    Past Surgical History:   Procedure Laterality Date    ENDOMETRIAL ABLATION      TUBAL LIGATION         Social History     Socioeconomic History    Marital status: Legally      Spouse name: Not on file    Number of children: Not on file    Years of education: Not on file    Highest education level: Not on file   Occupational History    Not on file   Tobacco Use    Smoking status: Never     Passive exposure: Never    Smokeless tobacco: Never   Vaping Use    Vaping Use: Never used   Substance and Sexual Activity    Alcohol use: Yes     Alcohol/week: 4.0 standard drinks of alcohol     Types: 4 Shots of liquor per week    Drug use: Not on file    Sexual activity: Not on file   Other Topics Concern    Not on file   Social History Narrative    Not on file     Social Determinants of Health     Financial Resource Strain: Not on file   Food Insecurity: No Food  "Insecurity (12/21/2023)    Hunger Vital Sign     Worried About Running Out of Food in the Last Year: Never true     Ran Out of Food in the Last Year: Never true   Transportation Needs: Not on file   Physical Activity: Not on file   Stress: Not on file   Social Connections: Not on file   Intimate Partner Violence: Not on file   Housing Stability: Not on file       No Known Allergies      Current Outpatient Medications:     dicyclomine (Bentyl) 10 mg capsule, Take 1 capsule (10 mg) by mouth 4 times a day as needed., Disp: , Rfl:     gabapentin (Neurontin) 100 mg capsule, Take 1 capsule (100 mg) by mouth if needed., Disp: , Rfl:     lidocaine (Xylocaine) 2 % solution, Take 10 mL by mouth 3 times a day as needed for mild pain (1 - 3). (Patient not taking: Reported on 1/19/2024), Disp: 100 mL, Rfl: 2    methocarbamol (Robaxin) 500 mg tablet, Take 1 tablet (500 mg) by mouth 4 times a day as needed., Disp: , Rfl:     sucralfate (Carafate) 1 gram tablet, Take 1 tablet (1 g) by mouth 4 times a day before meals. Dissolve tablet in 3 teaspoons of water (Patient not taking: Reported on 1/19/2024), Disp: 120 tablet, Rfl: 11    SUMAtriptan (Imitrex) 50 mg tablet, 1 tablet PO for severe headache, may repeat in 2 hrs(max 200 mg/24 hrs) Orally, Disp: , Rfl:     traMADol (Ultram) 50 mg tablet, TAKE 1 TABLET BY MOUTH EVERY 6 HOURS AS NEEDED FOR PAIN (Patient not taking: Reported on 11/3/2023), Disp: 10 tablet, Rfl: 0    venlafaxine XR (Effexor XR) 37.5 mg 24 hr capsule, Take 1 capsule (37.5 mg) by mouth once daily. Do not crush or chew. (Patient not taking: Reported on 12/21/2023), Disp: 30 capsule, Rfl: 2     Objective    BSA: 1.48 meters squared  /76   Pulse 58   Temp 36.2 °C (97.2 °F) (Temporal)   Resp 16   Ht 1.493 m (4' 10.78\")   Wt 53 kg (116 lb 13.5 oz)   SpO2 98%   BMI 23.78 kg/m²     ECOG Performance Status: 0    Physical Exam    GA: alert, oriented, cooperative  HEENT: anicteric sclera, well injected " conjunctiva  Heart: RRR, no murmurs or gallops heard  Lung: CTAB  Abd: +BS, soft, non tender abdomen  Ext; peripheral pulses positive, warm extremities, no lower extremity edema   Breast;: well healed incision on the left side, no new LN or nodule. Left breast erythema and skin sloughing consistent with radiation changes    Laboratory Data:  Lab Results   Component Value Date    WBC 4.8 08/11/2023    HGB 12.7 08/11/2023    HCT 37.5 08/11/2023    MCV 91 08/11/2023     08/11/2023    ANC 6.42 04/28/2023       Chemistry    Lab Results   Component Value Date/Time     08/11/2023 1018    K 3.7 08/11/2023 1018     08/11/2023 1018    CO2 25 08/11/2023 1018    BUN 13 08/11/2023 1018    CREATININE 0.70 08/11/2023 1018    Lab Results   Component Value Date/Time    CALCIUM 9.7 08/11/2023 1018    ALKPHOS 79 08/11/2023 1018    AST 25 08/11/2023 1018    ALT 38 08/11/2023 1018    BILITOT 0.6 08/11/2023 1018             Radiology:  Rad Onc CT Sim Images  These images are not reportable by radiology and will not be interpreted   by  Radiologists.       No results found for this or any previous visit from the past 365 days.          Assessment/Plan:    48 year old female overall medically healthy with recent diagnosis of left sided cT2N1 IDC ER 95%, TN 70%, HER2 2+, nonamplified by dual ROMERO MammaPrint index: -0.154, high risk luminal  B left axilla ultrasound-guided core needle biopsy showed metastatic carcinoma, discussed in TB with plan for neoadjuvant chemotherapy      On 12/21/2022 she had diagnostic mammogram that was done that showed heterogeneously dense breast tissue with a focal symptom marker overlying the outer left breast at anterior depth, adjacent to the marker is an irregular increased density mass with  architectural distortion.  BI-RADS Category 5    Ultrasound: This corresponds with an area of the lump localized at the 3 o'clock position of the left breast 4 cm from the nipple there was a 2.2 x  2.2 x 1.1 cm lobulated irregularly  irregular hypoechoic mass with multiple abnormal appearing left axillary (3) lymph nodes.      On 1/10/2023 she had left breast ultrasound-guided core needle biopsy showed invasive ductal carcinoma, grade 2-3, DCIS, high nuclear grade ER 95%, NC 70%, HER2 2+, nonamplified by dual ROMERO MammaPrint index: -0.154, high risk luminal B left axilla ultrasound-guided  core needle biopsy showed metastatic carcinoma     I reviewed with her the events that led to her diagnosis of breast cancer. We reviewed all the procedures and diagnostic imaging she underwent thus far. I discussed the features of her breast cancer that include the size, grade, lymph node status and  hormone receptor/ her2-xin status.     CT C/A/P showed focal breast tissue nodularity and prominent left axillary LN in addition to left Internal mammary LN with no evidence of distant disease; no evidence of bone mets on bone scan     8/11/2023: completed neoadjuvant chemotherapy with ddAC followed by weekly taxol    09/20/2023: Dr Rojas Performed a left PM and SLNB (0/3) that showed a complete pathological response     -01/04:  completed  Whole breast and regional adriana radiation to a dose of 42.56 Gy in 16 fractions with a simultaneous integrated boost to the positive intramammary node and lumpectomy bed    - starting anastrozole 1 mg  and vitamin D- adjuvant bisphosphonates to be discussed next visit    - baseline bone density    Fibroids  Pelvic/Uterine/Ovarian US: Fibroid in the right lower uterine body and Probable hemorrhagic corpus luteum left ovary.   - Follow-up with GYN      Thyroid nodules  Thyroid US: Right thyroid lobe 1.5 cm and left thyroid lobe 0.6 cm TIRADS 2 mixed solid and cystic nodules which are likely benign   - Likely benign no follow-up imaging recommended   - Defer to PCP for continued monitoring     Prescribed effexor 37.5mg for hot flushes     No orders of the defined types were placed in this  encounter.            Torsten Best MD  Hematology and Medical Oncology  [facility]

## 2024-01-30 ENCOUNTER — APPOINTMENT (OUTPATIENT)
Dept: RADIOLOGY | Facility: CLINIC | Age: 49
End: 2024-01-30
Payer: MEDICAID

## 2024-02-07 ENCOUNTER — HOSPITAL ENCOUNTER (OUTPATIENT)
Dept: RADIOLOGY | Facility: HOSPITAL | Age: 49
Discharge: HOME | End: 2024-02-07
Payer: MEDICAID

## 2024-02-07 DIAGNOSIS — C50.412 MALIGNANT NEOPLASM OF UPPER-OUTER QUADRANT OF LEFT BREAST IN FEMALE, ESTROGEN RECEPTOR POSITIVE (MULTI): ICD-10-CM

## 2024-02-07 DIAGNOSIS — Z17.0 MALIGNANT NEOPLASM OF UPPER-OUTER QUADRANT OF LEFT BREAST IN FEMALE, ESTROGEN RECEPTOR POSITIVE (MULTI): ICD-10-CM

## 2024-02-07 PROCEDURE — 77080 DXA BONE DENSITY AXIAL: CPT | Performed by: RADIOLOGY

## 2024-02-07 PROCEDURE — 77080 DXA BONE DENSITY AXIAL: CPT

## 2024-02-09 ENCOUNTER — HOSPITAL ENCOUNTER (OUTPATIENT)
Dept: CARDIOLOGY | Facility: HOSPITAL | Age: 49
Discharge: HOME | End: 2024-02-09
Payer: MEDICAID

## 2024-02-09 DIAGNOSIS — Z51.81 ENCOUNTER FOR MONITORING CARDIOTOXIC DRUG THERAPY: ICD-10-CM

## 2024-02-09 DIAGNOSIS — Z51.81 ENCOUNTER FOR THERAPEUTIC DRUG MONITORING: ICD-10-CM

## 2024-02-09 DIAGNOSIS — Z79.899 ENCOUNTER FOR MONITORING CARDIOTOXIC DRUG THERAPY: ICD-10-CM

## 2024-02-09 LAB
EJECTION FRACTION APICAL 4 CHAMBER: 59.7
EJECTION FRACTION: 63 %
LEFT ATRIUM VOLUME AREA LENGTH INDEX BSA: 19.9 ML/M2
LEFT VENTRICLE INTERNAL DIMENSION DIASTOLE: 4.1 CM (ref 3.5–6)
LEFT VENTRICULAR OUTFLOW TRACT DIAMETER: 1.89 CM
RIGHT VENTRICLE FREE WALL PEAK S': 12 CM/S
RIGHT VENTRICLE PEAK SYSTOLIC PRESSURE: 24.6 MMHG
TRICUSPID ANNULAR PLANE SYSTOLIC EXCURSION: 2.1 CM

## 2024-02-09 PROCEDURE — 93356 MYOCRD STRAIN IMG SPCKL TRCK: CPT | Performed by: INTERNAL MEDICINE

## 2024-02-09 PROCEDURE — 76376 3D RENDER W/INTRP POSTPROCES: CPT

## 2024-02-09 PROCEDURE — 93325 DOPPLER ECHO COLOR FLOW MAPG: CPT | Performed by: INTERNAL MEDICINE

## 2024-02-09 PROCEDURE — 93308 TTE F-UP OR LMTD: CPT | Performed by: INTERNAL MEDICINE

## 2024-02-09 PROCEDURE — 93321 DOPPLER ECHO F-UP/LMTD STD: CPT | Performed by: INTERNAL MEDICINE

## 2024-02-09 PROCEDURE — 76376 3D RENDER W/INTRP POSTPROCES: CPT | Performed by: INTERNAL MEDICINE

## 2024-02-14 ENCOUNTER — APPOINTMENT (OUTPATIENT)
Dept: NUTRITION | Facility: HOSPITAL | Age: 49
End: 2024-02-14
Payer: MEDICAID

## 2024-02-16 ENCOUNTER — APPOINTMENT (OUTPATIENT)
Dept: RADIATION ONCOLOGY | Facility: CLINIC | Age: 49
End: 2024-02-16
Payer: MEDICAID

## 2024-03-05 ENCOUNTER — HOSPITAL ENCOUNTER (OUTPATIENT)
Dept: RADIATION ONCOLOGY | Facility: CLINIC | Age: 49
Setting detail: RADIATION/ONCOLOGY SERIES
Discharge: HOME | End: 2024-03-05
Payer: MEDICAID

## 2024-03-05 VITALS
HEART RATE: 71 BPM | DIASTOLIC BLOOD PRESSURE: 86 MMHG | RESPIRATION RATE: 18 BRPM | BODY MASS INDEX: 23.98 KG/M2 | TEMPERATURE: 97.2 F | SYSTOLIC BLOOD PRESSURE: 135 MMHG | OXYGEN SATURATION: 96 % | WEIGHT: 117.84 LBS

## 2024-03-05 DIAGNOSIS — C50.812 MALIGNANT NEOPLASM OF OVERLAPPING SITES OF LEFT FEMALE BREAST (MULTI): ICD-10-CM

## 2024-03-05 PROCEDURE — 99024 POSTOP FOLLOW-UP VISIT: CPT | Performed by: STUDENT IN AN ORGANIZED HEALTH CARE EDUCATION/TRAINING PROGRAM

## 2024-03-05 ASSESSMENT — COLUMBIA-SUICIDE SEVERITY RATING SCALE - C-SSRS: 1. IN THE PAST MONTH, HAVE YOU WISHED YOU WERE DEAD OR WISHED YOU COULD GO TO SLEEP AND NOT WAKE UP?: NO

## 2024-03-05 ASSESSMENT — PATIENT HEALTH QUESTIONNAIRE - PHQ9
2. FEELING DOWN, DEPRESSED OR HOPELESS: NOT AT ALL
1. LITTLE INTEREST OR PLEASURE IN DOING THINGS: NOT AT ALL
SUM OF ALL RESPONSES TO PHQ9 QUESTIONS 1 AND 2: 0

## 2024-03-05 ASSESSMENT — PAIN SCALES - GENERAL
PAINLEVEL: 0-NO PAIN
PAINLEVEL: 4

## 2024-03-05 NOTE — PROGRESS NOTES
Patient is in today for follow up visit.  Last radiation treatment was 1/14/24. Patient states she did have peeling skin post radiation that is now healed. She started anastrazole 1 month ago and is feeling fatigued, has some hot flashes, and reports joint pain of 4/10. She sees Estephaina 4/19/24 and  5/7/24. Prior to appointment ending the patient was given information on how to contact this office and taken to scheduling desk to make next follow up. Verbalized understanding. No further concerns for nursing at this time.

## 2024-04-19 ENCOUNTER — OFFICE VISIT (OUTPATIENT)
Dept: HEMATOLOGY/ONCOLOGY | Facility: HOSPITAL | Age: 49
End: 2024-04-19
Payer: MEDICAID

## 2024-04-19 VITALS
HEART RATE: 69 BPM | WEIGHT: 116.84 LBS | OXYGEN SATURATION: 98 % | TEMPERATURE: 97.7 F | BODY MASS INDEX: 23.56 KG/M2 | SYSTOLIC BLOOD PRESSURE: 116 MMHG | HEIGHT: 59 IN | RESPIRATION RATE: 16 BRPM | DIASTOLIC BLOOD PRESSURE: 80 MMHG

## 2024-04-19 DIAGNOSIS — Z17.0 MALIGNANT NEOPLASM OF UPPER-OUTER QUADRANT OF LEFT BREAST IN FEMALE, ESTROGEN RECEPTOR POSITIVE (MULTI): ICD-10-CM

## 2024-04-19 DIAGNOSIS — C50.412 MALIGNANT NEOPLASM OF UPPER-OUTER QUADRANT OF LEFT BREAST IN FEMALE, ESTROGEN RECEPTOR POSITIVE (MULTI): ICD-10-CM

## 2024-04-19 PROCEDURE — 99214 OFFICE O/P EST MOD 30 MIN: CPT | Performed by: STUDENT IN AN ORGANIZED HEALTH CARE EDUCATION/TRAINING PROGRAM

## 2024-04-19 RX ORDER — PENICILLIN V POTASSIUM 500 MG/1
500 TABLET, FILM COATED ORAL 4 TIMES DAILY
COMMUNITY

## 2024-04-19 ASSESSMENT — PAIN SCALES - GENERAL: PAINLEVEL: 6

## 2024-04-19 NOTE — PROGRESS NOTES
Patient ID: Sapna Henriquez is a 48 y.o. female.    The patient presents to clinic today for her history of breast cancer.     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, estrogen receptor positive (Multi)  Staging form: Breast, AJCC 8th Edition  - Clinical stage from 10/25/2023: Stage IIIB (cT2, cN3b, cM0, G3, ER+, IA+, HER2: Equivocal) - Signed by Starla Calles DO on 10/25/2023  - Pathologic stage from 10/25/2023: No Stage Recommended (ypT0, pN0, cM0) - Signed by Starla Calles DO on 10/25/2023        Diagnostic/Therapeutic History:    On 12/21/2022 she had diagnostic mammogram that was done that showed heterogeneously dense breast tissue with a focal symptom marker overlying the outer left breast  at anterior depth, adjacent to the marker is an irregular increased density mass with architectural distortion.  BI-RADS Category 5   Ultrasound: This corresponds with an area of the lump localized at the 3 o'clock position of the left breast 4 cm from the nipple there was a 2.2 x 2.2 x 1.1 cm lobulated irregularly irregular hypoechoic mass with multiple abnormal appearing left axillary  lymph nodes.      On 1/10/2023 she had left breast ultrasound-guided core needle biopsy showed invasive ductal carcinoma, grade 2-3, DCIS, high nuclear grade ER 95%, IA 70%, HER2 2+, nonamplified by dual ROMERO MammaPrint index: -0.154, high risk luminal B left axilla ultrasound-guided  core needle biopsy showed metastatic carcinoma       CT C/A/P showed focal breast tissue nodularity and prominent left axillary LN in addition to left Internal mammary LN with no evidence of distant disease; no evidence of bone mets on bone scan     8/11/2023: completed neoadjuvant chemotherapy with ddAC followed by weekly taxol    09/20/2023: Dr Rojas Performed a left PM and SLNB (0/3) that showed a complete pathological response     History of Present Illness (HPI)/Interval History:  Does have hot flushes, quite severe as well as bony  pain. Does have fatigue    She denies any fevers or chills.    She denies any chest pain or breathing issues.     She denies any vision changes or headache issues, dizziness, loss of balance,  and no falls.     She denies any new or unexplained bone aches or pains.    She denies any skin lesions or masses, hair loss, nail changes, or oral sores.    She reports a normal appetite and normal bowel movements. Her weight is stable.     PMH:  No PMH , horseshoe kidney     PSH: tubal ligation,      Allergies: NKDA     No meds     Reproductive hx: menarche 14, 9 years ago no menses since ligation, , 2nd one who got , OCP x couple years, No HRT     Family hx: mom at age 41 with breast cancer, grandma breast cancer and ovarian cancer  at 73, greaatgrandma breast cancer  at 58, uncle for mom’s side colon cancer, grandfather on mother side has skin cancer     Social hx: non smoker, alcohol twice a week          Review of Systems:  14-point ROS otherwise negative, as per HPI.    No past medical history on file.    Past Surgical History:   Procedure Laterality Date    ENDOMETRIAL ABLATION      TUBAL LIGATION         Social History     Socioeconomic History    Marital status: Legally      Spouse name: Not on file    Number of children: Not on file    Years of education: Not on file    Highest education level: Not on file   Occupational History    Not on file   Tobacco Use    Smoking status: Never     Passive exposure: Never    Smokeless tobacco: Never   Vaping Use    Vaping status: Never Used   Substance and Sexual Activity    Alcohol use: Yes     Alcohol/week: 4.0 standard drinks of alcohol     Types: 4 Shots of liquor per week    Drug use: Not on file    Sexual activity: Not on file   Other Topics Concern    Not on file   Social History Narrative    Not on file     Social Determinants of Health     Financial Resource Strain: Not on File (2022)    Received from [facility]     Financial Resource  Strain     Financial Resource Strain: 0   Food Insecurity: No Food Insecurity (2023)    Hunger Vital Sign     Worried About Running Out of Food in the Last Year: Never true     Ran Out of Food in the Last Year: Never true   Transportation Needs: Not on File (2022)    Received from [facility]     Transportation Needs     Transportation: 0   Physical Activity: Not on File (2022)    Received from [facility]     Physical Activity     Physical Activity: 0   Stress: Not on File (2022)    Received from [facility]     Stress     Stress: 0   Social Connections: Not on File (2022)    Received from [facility]     Social Connections     Social Connections and Isolation: 0   Intimate Partner Violence: Not on file   Housing Stability: Not on File (2022)    Received from [facility]     Housing Stability     Housin       No Known Allergies      Current Outpatient Medications:     anastrozole (Arimidex) 1 mg tablet, Take 1 tablet (1 mg total) by mouth once daily.  Swallow whole with a drink of water., Disp: 90 tablet, Rfl: 2    gabapentin (Neurontin) 100 mg capsule, Take 1 capsule (100 mg) by mouth if needed., Disp: , Rfl:     penicillin v potassium (Veetid) 500 mg tablet, Take 1 tablet (500 mg) by mouth 4 times a day., Disp: , Rfl:     dicyclomine (Bentyl) 10 mg capsule, Take 1 capsule (10 mg) by mouth 4 times a day as needed., Disp: , Rfl:     methocarbamol (Robaxin) 500 mg tablet, Take 1 tablet (500 mg) by mouth 4 times a day as needed., Disp: , Rfl:     sucralfate (Carafate) 1 gram tablet, Take 1 tablet (1 g) by mouth 4 times a day before meals. Dissolve tablet in 3 teaspoons of water (Patient not taking: Reported on 2024), Disp: 120 tablet, Rfl: 11    SUMAtriptan (Imitrex) 50 mg tablet, 1 tablet PO for severe headache, may repeat in 2 hrs(max 200 mg/24 hrs) Orally, Disp: , Rfl:     venlafaxine XR (Effexor XR) 37.5 mg 24 hr capsule, Take 1 capsule (37.5 mg) by mouth once daily. Do not crush or chew. (Patient  "not taking: Reported on 12/21/2023), Disp: 30 capsule, Rfl: 2     Objective    BSA: 1.48 meters squared  /80   Pulse 69   Temp 36.5 °C (97.7 °F) (Temporal)   Resp 16   Ht 1.493 m (4' 10.78\")   Wt 53 kg (116 lb 13.5 oz)   SpO2 98%   BMI 23.78 kg/m²     ECOG Performance Status: 0    Physical Exam    GA: alert, oriented, cooperative  HEENT: anicteric sclera, well injected conjunctiva  Heart: RRR, no murmurs or gallops heard  Lung: CTAB  Abd: +BS, soft, non tender abdomen  Ext; peripheral pulses positive, warm extremities, no lower extremity edema   Breast;: well healed incision on the left side, no new LN or nodule    Laboratory Data:  Lab Results   Component Value Date    WBC 4.8 08/11/2023    HGB 12.7 08/11/2023    HCT 37.5 08/11/2023    MCV 91 08/11/2023     08/11/2023    ANC 6.42 04/28/2023       Chemistry    Lab Results   Component Value Date/Time     08/11/2023 1018    K 3.7 08/11/2023 1018     08/11/2023 1018    CO2 25 08/11/2023 1018    BUN 13 08/11/2023 1018    CREATININE 0.70 08/11/2023 1018    Lab Results   Component Value Date/Time    CALCIUM 9.7 08/11/2023 1018    ALKPHOS 79 08/11/2023 1018    AST 25 08/11/2023 1018    ALT 38 08/11/2023 1018    BILITOT 0.6 08/11/2023 1018             Radiology:  [facility] (Strain and 3D)     [facility], [address] [place] [address]                 Tel [phone] and Fax [phone]    TRANSTHORACIC ECHOCARDIOGRAM REPORT       Patient Name:     CHARLINE Marie Physician:  [id number] Albaro Oreilly MD  Study Date:       2/9/2024            Ordering Provider:  [id number] CARMEN CASTRO  MRN/PID:          [id number]            Fellow:  Accession#:       [id number]        Nurse:  Date of           1975 / [birth year]      Sonographer:        Stephania Tejada RDCS  Birth/Age:        years  Gender:           F                   Additional Staff:  Height:    "        147.32 cm           Admit Date:  Weight:           52.62 kg            Admission Status:   Outpatient  BSA:              1.44 m2             Encounter#:         [id number]                                        Department          [facility]                                        Location:  Blood Pressure: 114 /79 mmHg    Study Type:    ONCO-ECHO LIMITED (STRAIN AND 3D)  Diagnosis/ICD: Encounter for monitoring cardiotoxic drug therapy-Z51.81  CPT Code:      Echo Limited-97178; Color Doppler-88603; Doppler Full-81996; 3D                 Rendering w/o independent workstation-[id number]; Myocardial Strain                 Imaging-40997    Patient History:  Pertinent History: Breast cancer.    Study Detail: The following Echo studies were performed: 2D, M-Mode, color flow,                Strain, 3D and Doppler.       PHYSICIAN INTERPRETATION:  Left Ventricle: The left ventricular systolic function is normal, with an estimated ejection fraction of 60-65%. There are no regional wall motion abnormalities. The left ventricular cavity size is normal. There is no evidence of left ventricular hypertrophy. Left ventricular diastolic filling was not assessed.  Left Atrium: The left atrium is normal in size.  Right Ventricle: The right ventricle is normal in size. There is normal right ventricular global systolic function.  Right Atrium: The right atrium is normal in size.  Aortic Valve: The aortic valve is trileaflet. There is no evidence of aortic valve regurgitation.  Mitral Valve: The mitral valve is normal in structure. There is trace mitral valve regurgitation.  Tricuspid Valve: The tricuspid valve is structurally normal. There is trace tricuspid regurgitation.  Pulmonic Valve: The pulmonic valve is not well visualized. The pulmonic valve regurgitation was not well visualized.  Pericardium: There is no pericardial effusion noted.  Aorta: The aortic root is normal.  Pulmonary Artery: The tricuspid regurgitant velocity  is 2.04 m/s, and with an estimated right atrial pressure of 8 mmHg, the estimated pulmonary artery pressure is normal with the RVSP at 24.6 mmHg.  Systemic Veins: The inferior vena cava was not well visualized.  In comparison to the previous echocardiogram(s): Compared with study from 8/11/2023, no significant change.     ONCO-CARDIOLOGY:  Vital Signs: The patients heart rate during the study was 55 beats per minute.  The patients blood pressure was 114/79 during the study.  Machine: This study was performed on the Zoom[employer] S70.  Previous Study: The patient had a previous Onco-Cardiology echocardiogram dated  8/11/2023. The patient's previous study was performed on the Zoom[employer] E-9.       Onco-Cardiology Measurements:  Historical Measurements from Previous Study  2D EF (Biplane)                     60%  3D EF                               66%  Global Longitudinal Strain (GLS) -16.7%    Current Measurements  2D EF (Biplane)                   63%  3D EF                             64%  Global Longitudinal Strain (GLS) -18%    GLS Tracking Quality: Good       CONCLUSIONS:   1. Left ventricular systolic function is normal with a 60-65% estimated ejection fraction.   2. There is no evidence of left ventricular hypertrophy.    QUANTITATIVE DATA SUMMARY:  2D MEASUREMENTS:                           Normal Ranges:  Ao Root d:     2.70 cm   (2.0-3.7cm)  IVSd:          0.60 cm   (0.6-1.1cm)  LVPWd:         0.60 cm   (0.6-1.1cm)  LVIDd:         4.10 cm   (3.9-5.9cm)  LVIDs:         2.60 cm  LV Mass Index: 46.5 g/m2  LV % FS        36.6 %    LA VOLUME:                                Normal Ranges:  LA Vol A4C:        31.5 ml    (22+/-6mL/m2)  LA Vol A2C:        23.9 ml  LA Vol BP:         28.7 ml  LA Vol Index A4C:  21.8ml/m2  LA Vol Index A2C:  16.6 ml/m2  LA Vol Index BP:   19.9 ml/m2  LA Area A4C:       13.2 cm2  LA Area A2C:       11.0 cm2  LA Major Axis A4C: 4.7 cm  LA Major Axis A2C: 4.3 cm    M-MODE MEASUREMENTS:                    Normal Ranges:  Ao Root: 2.70 cm (2.0-3.7cm)    LV SYSTOLIC FUNCTION BY 2D PLANIMETRY (MOD):                      Normal Ranges:  EF-A4C View: 59.7 % (>=55%)  EF-A2C View: 66.1 %  EF-Biplane:  63.2 %    LV DIASTOLIC FUNCTION:                         Normal Ranges:  MV e'         0.11 m/s (>8.0)  MV lateral e' 0.12 m/s  MV medial e'  0.10 m/s    AORTIC VALVE:                          Normal Ranges:  LVOT Max Waldo:  0.99 m/s (<=1.1m/s)  LVOT VTI:      23.22 cm  LVOT Diameter: 1.89 cm  (1.8-2.4cm)       RIGHT VENTRICLE:  RV Basal 3.10 cm  RV Mid   2.30 cm  RV Major 8.5 cm  TAPSE:   21.0 mm  RV s'    0.12 m/s    TRICUSPID VALVE/RVSP:                              Normal Ranges:  Peak TR Velocity: 2.04 m/s  RV Syst Pressure: 24.6 mmHg (< 30mmHg)       [id number] Albaro Oreilly MD  Electronically signed on 2/9/2024 at 4:22:30 PM       ** Final **       No results found for this or any previous visit from the past 365 days.          Assessment/Plan:      47 year old female overall medically healthy with recent diagnosis of left sided cT2N1 IDC ER 95%, OH 70%, HER2 2+, nonamplified by dual ROMERO MammaPrint index: -0.154, high risk luminal  B left axilla ultrasound-guided core needle biopsy showed metastatic carcinoma, discussed in TB with plan for neoadjuvant chemotherapy      On 12/21/2022 she had diagnostic mammogram that was done that showed heterogeneously dense breast tissue with a focal symptom marker overlying the outer left breast at anterior depth, adjacent to the marker is an irregular increased density mass with  architectural distortion.  BI-RADS Category 5   Ultrasound: This corresponds with an area of the lump localized at the 3 o'clock position of the left breast 4 cm from the nipple there was a 2.2 x 2.2 x 1.1 cm lobulated irregularly  irregular hypoechoic mass with multiple abnormal appearing left axillary lymph nodes.      On 1/10/2023 she had left breast ultrasound-guided core needle biopsy showed invasive ductal  carcinoma, grade 2-3, DCIS, high nuclear grade ER 95%, WA 70%, HER2 2+, nonamplified by dual ROMERO MammaPrint index: -0.154, high risk luminal B left axilla ultrasound-guided  core needle biopsy showed metastatic carcinoma     I reviewed with her the events that led to her diagnosis of breast cancer. We reviewed all the procedures and diagnostic imaging she underwent thus far. I discussed the features of her breast cancer that include the size, grade, lymph node status and  hormone receptor/ her2-xin status.     CT C/A/P showed focal breast tissue nodularity and prominent left axillary LN in addition to left Internal mammary LN with no evidence of distant disease; no evidence of bone mets on bone scan     8/11/2023: completed neoadjuvant chemotherapy with ddAC followed by weekly taxol    09/20/2023: Dr Rojas Performed a left PM and SLNB (0/3) that showed a complete pathological response     - plan for Whole breast and regional adriana radiation to a dose of 42.56 Gy in 16 fractions with a simultaneous integrated boost to the positive intramammary node and lumpectomy bed    Fibroids  Pelvic/Uterine/Ovarian US: Fibroid in the right lower uterine body and Probable hemorrhagic corpus luteum left ovary.   - Follow-up with GYN      Thyroid nodules  Thyroid US: Right thyroid lobe 1.5 cm and left thyroid lobe 0.6 cm TIRADS 2 mixed solid and cystic nodules which are likely benign   - Likely benign no follow-up imaging recommended   - Defer to PCP for continued monitoring     On anastrozole, experiencing side effects. 2 weeks break and will fup  FSH, LH, Estradiol ordered.    No orders of the defined types were placed in this encounter.            Torsten Best MD  Hematology and Medical Oncology  [facility]

## 2024-05-01 ENCOUNTER — LAB (OUTPATIENT)
Dept: LAB | Facility: LAB | Age: 49
End: 2024-05-01
Payer: MEDICAID

## 2024-05-01 DIAGNOSIS — Z17.0 MALIGNANT NEOPLASM OF UPPER-OUTER QUADRANT OF LEFT BREAST IN FEMALE, ESTROGEN RECEPTOR POSITIVE (MULTI): ICD-10-CM

## 2024-05-01 DIAGNOSIS — C50.412 MALIGNANT NEOPLASM OF UPPER-OUTER QUADRANT OF LEFT BREAST IN FEMALE, ESTROGEN RECEPTOR POSITIVE (MULTI): ICD-10-CM

## 2024-05-01 LAB
ESTRADIOL SERPL-MCNC: <19 PG/ML
FSH SERPL-ACNC: 131.5 IU/L
LH SERPL-ACNC: 64.3 IU/L

## 2024-05-01 PROCEDURE — 83002 ASSAY OF GONADOTROPIN (LH): CPT

## 2024-05-01 PROCEDURE — 36415 COLL VENOUS BLD VENIPUNCTURE: CPT

## 2024-05-01 PROCEDURE — 82670 ASSAY OF TOTAL ESTRADIOL: CPT

## 2024-05-01 PROCEDURE — 83001 ASSAY OF GONADOTROPIN (FSH): CPT

## 2024-05-02 ENCOUNTER — TELEPHONE (OUTPATIENT)
Dept: ADMISSION | Facility: HOSPITAL | Age: 49
End: 2024-05-02
Payer: MEDICAID

## 2024-05-02 NOTE — PROGRESS NOTES
Patient ID: Sapna Henriquez is a 48 y.o. female.    The patient presents to clinic today for her history of breast cancer.     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, estrogen receptor positive (Multi)  Staging form: Breast, AJCC 8th Edition  - Clinical stage from 10/25/2023: Stage IIIB (cT2, cN3b, cM0, G3, ER+, KS+, HER2: Equivocal) - Signed by Starla Calles DO on 10/25/2023  - Pathologic stage from 10/25/2023: No Stage Recommended (ypT0, pN0, cM0) - Signed by Starla Calles DO on 10/25/2023        Diagnostic/Therapeutic History:    On 12/21/2022 she had diagnostic mammogram that was done that showed heterogeneously dense breast tissue with a focal symptom marker overlying the outer left breast  at anterior depth, adjacent to the marker is an irregular increased density mass with architectural distortion.  BI-RADS Category 5   Ultrasound: This corresponds with an area of the lump localized at the 3 o'clock position of the left breast 4 cm from the nipple there was a 2.2 x 2.2 x 1.1 cm lobulated irregularly irregular hypoechoic mass with multiple abnormal appearing left axillary  lymph nodes.      On 1/10/2023 she had left breast ultrasound-guided core needle biopsy showed invasive ductal carcinoma, grade 2-3, DCIS, high nuclear grade ER 95%, KS 70%, HER2 2+, nonamplified by dual ROMERO MammaPrint index: -0.154, high risk luminal B left axilla ultrasound-guided  core needle biopsy showed metastatic carcinoma       CT C/A/P showed focal breast tissue nodularity and prominent left axillary LN in addition to left Internal mammary LN with no evidence of distant disease; no evidence of bone mets on bone scan     8/11/2023: completed neoadjuvant chemotherapy with ddAC followed by weekly taxol    09/20/2023: Dr Rojas Performed a left PM and SLNB (0/3) that showed a complete pathological response     History of Present Illness (HPI)/Interval History:  Did have hot flushes, quite severe as well as bony  pain. Does have fatigue. Symptoms improved off anastrozole when she was in [place]. Then when she came back, symptoms restarted. We discussed adding claritin    She denies any fevers or chills.    She denies any chest pain or breathing issues.     She denies any vision changes or headache issues, dizziness, loss of balance,  and no falls.     She denies any new or unexplained bone aches or pains.    She denies any skin lesions or masses, hair loss, nail changes, or oral sores.    She reports a normal appetite and normal bowel movements. Her weight is stable.     PMH:  No PMH , horseshoe kidney     PSH: tubal ligation,      Allergies: NKDA     No meds     Reproductive hx: menarche 14, 9 years ago no menses since ligation, , 2nd one who got , OCP x couple years, No HRT     Family hx: mom at age 41 with breast cancer, grandma breast cancer and ovarian cancer  at 73, greaatgrandma breast cancer  at 58, uncle for mom’s side colon cancer, grandfather on mother side has skin cancer     Social hx: non smoker, alcohol twice a week          Review of Systems:  14-point ROS otherwise negative, as per HPI.    No past medical history on file.    Past Surgical History:   Procedure Laterality Date    ENDOMETRIAL ABLATION      TUBAL LIGATION         Social History     Socioeconomic History    Marital status: Legally      Spouse name: Not on file    Number of children: Not on file    Years of education: Not on file    Highest education level: Not on file   Occupational History    Not on file   Tobacco Use    Smoking status: Never     Passive exposure: Never    Smokeless tobacco: Never   Vaping Use    Vaping status: Never Used   Substance and Sexual Activity    Alcohol use: Yes     Alcohol/week: 4.0 standard drinks of alcohol     Types: 4 Shots of liquor per week    Drug use: Not on file    Sexual activity: Not on file   Other Topics Concern    Not on file   Social History Narrative    Not on file      Social Determinants of Health     Financial Resource Strain: Not on File (2022)    Received from [facility]     Financial Resource Strain     Financial Resource Strain: 0   Food Insecurity: No Food Insecurity (2023)    Hunger Vital Sign     Worried About Running Out of Food in the Last Year: Never true     Ran Out of Food in the Last Year: Never true   Transportation Needs: Not on File (2022)    Received from [facility]     Transportation Needs     Transportation: 0   Physical Activity: Not on File (2022)    Received from [facility]     Physical Activity     Physical Activity: 0   Stress: Not on File (2022)    Received from [facility]     Stress     Stress: 0   Social Connections: Not on File (2022)    Received from [facility]     Social Connections     Social Connections and Isolation: 0   Intimate Partner Violence: Not on file   Housing Stability: Not on File (2022)    Received from [employer]     Housing Stability     Housin       No Known Allergies      Current Outpatient Medications:     anastrozole (Arimidex) 1 mg tablet, Take 1 tablet (1 mg total) by mouth once daily.  Swallow whole with a drink of water., Disp: 90 tablet, Rfl: 2    dicyclomine (Bentyl) 10 mg capsule, Take 1 capsule (10 mg) by mouth 4 times a day as needed., Disp: , Rfl:     gabapentin (Neurontin) 100 mg capsule, Take 1 capsule (100 mg) by mouth if needed., Disp: , Rfl:     methocarbamol (Robaxin) 500 mg tablet, Take 1 tablet (500 mg) by mouth 4 times a day as needed., Disp: , Rfl:     penicillin v potassium (Veetid) 500 mg tablet, Take 1 tablet (500 mg) by mouth 4 times a day., Disp: , Rfl:     sucralfate (Carafate) 1 gram tablet, Take 1 tablet (1 g) by mouth 4 times a day before meals. Dissolve tablet in 3 teaspoons of water (Patient not taking: Reported on 2024), Disp: 120 tablet, Rfl: 11    SUMAtriptan (Imitrex) 50 mg tablet, 1 tablet PO for severe headache, may repeat in 2 hrs(max 200 mg/24 hrs) Orally, Disp: ,  Rfl:     venlafaxine XR (Effexor XR) 37.5 mg 24 hr capsule, Take 1 capsule (37.5 mg) by mouth once daily. Do not crush or chew. (Patient not taking: Reported on 12/21/2023), Disp: 30 capsule, Rfl: 2     Objective    BSA: There is no height or weight on file to calculate BSA.  There were no vitals taken for this visit.    ECOG Performance Status: 0    Physical Exam    GA: alert, oriented, cooperative  HEENT: anicteric sclera, well injected conjunctiva  Heart: RRR, no murmurs or gallops heard  Lung: CTAB  Abd: +BS, soft, non tender abdomen  Ext; peripheral pulses positive, warm extremities, no lower extremity edema   Breast;: well healed incision on the left side, no new LN or nodule    Laboratory Data:  Lab Results   Component Value Date    WBC 4.8 08/11/2023    HGB 12.7 08/11/2023    HCT 37.5 08/11/2023    MCV 91 08/11/2023     08/11/2023    ANC 6.42 04/28/2023       Chemistry    Lab Results   Component Value Date/Time     08/11/2023 1018    K 3.7 08/11/2023 1018     08/11/2023 1018    CO2 25 08/11/2023 1018    BUN 13 08/11/2023 1018    CREATININE 0.70 08/11/2023 1018    Lab Results   Component Value Date/Time    CALCIUM 9.7 08/11/2023 1018    ALKPHOS 79 08/11/2023 1018    AST 25 08/11/2023 1018    ALT 38 08/11/2023 1018    BILITOT 0.6 08/11/2023 1018             Radiology:  Onco-Echo Limited (Strain and 3D)     [facility], [address] [place] [address]                 Tel [phone] and Fax [phone]    TRANSTHORACIC ECHOCARDIOGRAM REPORT       Patient Name:     CHARLINE Marie Physician:  [id number] Albaro Oreilly MD  Study Date:       2/9/2024            Ordering Provider:  [id number] CARMEN CASTRO  MRN/PID:          [id number]            Fellow:  Accession#:       [id number]        Nurse:  Date of           1975 / 48      Sonographer:        Stephania Tejada [facility]  Birth/Age:        years  Gender:            F                   Additional Staff:  Height:           147.32 cm           Admit Date:  Weight:           52.62 kg            Admission Status:   Outpatient  BSA:              1.44 m2             Encounter#:         [id number]                                        Department          [facility] Echo [facility]                                        Location:  Blood Pressure: 114 /79 mmHg    Study Type:    ONCO-ECHO LIMITED (STRAIN AND 3D)  Diagnosis/ICD: Encounter for monitoring cardiotoxic drug therapy-Z51.81  CPT Code:      Echo Limited-00962; Color Doppler-77432; Doppler Full-22392; 3D                 Rendering w/o independent workstation-[id number]; Myocardial Strain                 Imaging-[id number]    Patient History:  Pertinent History: Breast cancer.    Study Detail: The following Echo studies were performed: 2D, M-Mode, color flow,                Strain, 3D and Doppler.       PHYSICIAN INTERPRETATION:  Left Ventricle: The left ventricular systolic function is normal, with an estimated ejection fraction of 60-65%. There are no regional wall motion abnormalities. The left ventricular cavity size is normal. There is no evidence of left ventricular hypertrophy. Left ventricular diastolic filling was not assessed.  Left Atrium: The left atrium is normal in size.  Right Ventricle: The right ventricle is normal in size. There is normal right ventricular global systolic function.  Right Atrium: The right atrium is normal in size.  Aortic Valve: The aortic valve is trileaflet. There is no evidence of aortic valve regurgitation.  Mitral Valve: The mitral valve is normal in structure. There is trace mitral valve regurgitation.  Tricuspid Valve: The tricuspid valve is structurally normal. There is trace tricuspid regurgitation.  Pulmonic Valve: The pulmonic valve is not well visualized. The pulmonic valve regurgitation was not well visualized.  Pericardium: There is no pericardial effusion noted.  Aorta: The aortic root is  normal.  Pulmonary Artery: The tricuspid regurgitant velocity is 2.04 m/s, and with an estimated right atrial pressure of 8 mmHg, the estimated pulmonary artery pressure is normal with the RVSP at 24.6 mmHg.  Systemic Veins: The inferior vena cava was not well visualized.  In comparison to the previous echocardiogram(s): Compared with study from 8/11/2023, no significant change.     ONCO-CARDIOLOGY:  Vital Signs: The patients heart rate during the study was 55 beats per minute.  The patients blood pressure was 114/79 during the study.  Machine: This study was performed on the Traxo S70.  Previous Study: The patient had a previous Onco-Cardiology echocardiogram dated  8/11/2023. The patient's previous study was performed on the Traxo E-9.       Onco-Cardiology Measurements:  Historical Measurements from Previous Study  2D EF (Biplane)                     60%  3D EF                               66%  Global Longitudinal Strain (GLS) -16.7%    Current Measurements  2D EF (Biplane)                   63%  3D EF                             64%  Global Longitudinal Strain (GLS) -18%    GLS Tracking Quality: Good       CONCLUSIONS:   1. Left ventricular systolic function is normal with a 60-65% estimated ejection fraction.   2. There is no evidence of left ventricular hypertrophy.    QUANTITATIVE DATA SUMMARY:  2D MEASUREMENTS:                           Normal Ranges:  Ao Root d:     2.70 cm   (2.0-3.7cm)  IVSd:          0.60 cm   (0.6-1.1cm)  LVPWd:         0.60 cm   (0.6-1.1cm)  LVIDd:         4.10 cm   (3.9-5.9cm)  LVIDs:         2.60 cm  LV Mass Index: 46.5 g/m2  LV % FS        36.6 %    LA VOLUME:                                Normal Ranges:  LA Vol A4C:        31.5 ml    (22+/-6mL/m2)  LA Vol A2C:        23.9 ml  LA Vol BP:         28.7 ml  LA Vol Index A4C:  21.8ml/m2  LA Vol Index A2C:  16.6 ml/m2  LA Vol Index BP:   19.9 ml/m2  LA Area A4C:       13.2 cm2  LA Area A2C:       11.0 cm2  LA Major Axis A4C: 4.7 cm  LA Major  Axis A2C: 4.3 cm    M-MODE MEASUREMENTS:                   Normal Ranges:  Ao Root: 2.70 cm (2.0-3.7cm)    LV SYSTOLIC FUNCTION BY 2D PLANIMETRY (MOD):                      Normal Ranges:  EF-A4C View: 59.7 % (>=55%)  EF-A2C View: 66.1 %  EF-Biplane:  63.2 %    LV DIASTOLIC FUNCTION:                         Normal Ranges:  MV e'         0.11 m/s (>8.0)  MV lateral e' 0.12 m/s  MV medial e'  0.10 m/s    AORTIC VALVE:                          Normal Ranges:  LVOT Max Waldo:  0.99 m/s (<=1.1m/s)  LVOT VTI:      23.22 cm  LVOT Diameter: 1.89 cm  (1.8-2.4cm)       RIGHT VENTRICLE:  RV Basal 3.10 cm  RV Mid   2.30 cm  RV Major 8.5 cm  TAPSE:   21.0 mm  RV s'    0.12 m/s    TRICUSPID VALVE/RVSP:                              Normal Ranges:  Peak TR Velocity: 2.04 m/s  RV Syst Pressure: 24.6 mmHg (< 30mmHg)       [id number] Albaro Oreilly MD  Electronically signed on 2/9/2024 at 4:22:30 PM       ** Final **       No results found for this or any previous visit from the past 365 days.          Assessment/Plan:      47 year old female overall medically healthy with recent diagnosis of left sided cT2N1 IDC ER 95%, RI 70%, HER2 2+, nonamplified by dual ROMERO MammaPrint index: -0.154, high risk luminal  B left axilla ultrasound-guided core needle biopsy showed metastatic carcinoma, discussed in TB with plan for neoadjuvant chemotherapy      On 12/21/2022 she had diagnostic mammogram that was done that showed heterogeneously dense breast tissue with a focal symptom marker overlying the outer left breast at anterior depth, adjacent to the marker is an irregular increased density mass with  architectural distortion.  BI-RADS Category 5   Ultrasound: This corresponds with an area of the lump localized at the 3 o'clock position of the left breast 4 cm from the nipple there was a 2.2 x 2.2 x 1.1 cm lobulated irregularly  irregular hypoechoic mass with multiple abnormal appearing left axillary lymph nodes.      On 1/10/2023 she had left breast  ultrasound-guided core needle biopsy showed invasive ductal carcinoma, grade 2-3, DCIS, high nuclear grade ER 95%, VT 70%, HER2 2+, nonamplified by dual ROMERO MammaPrint index: -0.154, high risk luminal B left axilla ultrasound-guided  core needle biopsy showed metastatic carcinoma     I reviewed with her the events that led to her diagnosis of breast cancer. We reviewed all the procedures and diagnostic imaging she underwent thus far. I discussed the features of her breast cancer that include the size, grade, lymph node status and  hormone receptor/ her2-xin status.     CT C/A/P showed focal breast tissue nodularity and prominent left axillary LN in addition to left Internal mammary LN with no evidence of distant disease; no evidence of bone mets on bone scan     8/11/2023: completed neoadjuvant chemotherapy with ddAC followed by weekly taxol    09/20/2023: Dr Rojas Performed a left PM and SLNB (0/3) that showed a complete pathological response     - s/p Whole breast and regional adriana radiation to a dose of 42.56 Gy in 16 fractions with a simultaneous integrated boost to the positive intramammary node and lumpectomy bed    Started on anastrozole and Did have hot flushes, quite severe as well as bony pain.Symptoms improved off anastrozole when she was in [place]. Then when she came back, symptoms restarted. We discussed adding claritin. She was agreable. Resuming anastrozole. Recent lab panel: estradiol<19, FSH: 131    Fibroids  Pelvic/Uterine/Ovarian US: Fibroid in the right lower uterine body and Probable hemorrhagic corpus luteum left ovary.   - Follow-up with GYN      Thyroid nodules  Thyroid US: Right thyroid lobe 1.5 cm and left thyroid lobe 0.6 cm TIRADS 2 mixed solid and cystic nodules which are likely benign   - Likely benign no follow-up imaging recommended   - Defer to PCP for continued monitoring     RTC in  with [facility] mammogram ordered- fup with Dr Rojas in July    No orders of the defined  types were placed in this encounter.            Torsten Best MD  Hematology and Medical Oncology  [facility]

## 2024-05-03 ENCOUNTER — TELEMEDICINE (OUTPATIENT)
Dept: HEMATOLOGY/ONCOLOGY | Facility: HOSPITAL | Age: 49
End: 2024-05-03
Payer: MEDICAID

## 2024-05-03 DIAGNOSIS — C77.1: Primary | ICD-10-CM

## 2024-05-03 DIAGNOSIS — C50.912: Primary | ICD-10-CM

## 2024-05-03 DIAGNOSIS — C77.3: Primary | ICD-10-CM

## 2024-05-03 PROCEDURE — 99214 OFFICE O/P EST MOD 30 MIN: CPT | Performed by: STUDENT IN AN ORGANIZED HEALTH CARE EDUCATION/TRAINING PROGRAM

## 2024-05-07 ENCOUNTER — APPOINTMENT (OUTPATIENT)
Dept: SURGICAL ONCOLOGY | Facility: CLINIC | Age: 49
End: 2024-05-07
Payer: MEDICAID

## 2024-05-09 ENCOUNTER — TELEPHONE (OUTPATIENT)
Dept: ADMISSION | Facility: HOSPITAL | Age: 49
End: 2024-05-09
Payer: MEDICAID

## 2024-05-09 DIAGNOSIS — Z17.0 MALIGNANT NEOPLASM OF UPPER-OUTER QUADRANT OF LEFT BREAST IN FEMALE, ESTROGEN RECEPTOR POSITIVE (MULTI): ICD-10-CM

## 2024-05-09 DIAGNOSIS — C50.412 MALIGNANT NEOPLASM OF UPPER-OUTER QUADRANT OF LEFT BREAST IN FEMALE, ESTROGEN RECEPTOR POSITIVE (MULTI): ICD-10-CM

## 2024-05-09 RX ORDER — ANASTROZOLE 1 MG/1
1 TABLET ORAL DAILY
Qty: 90 TABLET | Refills: 3 | Status: SHIPPED | OUTPATIENT
Start: 2024-05-09 | End: 2025-05-09

## 2024-06-03 ENCOUNTER — HOSPITAL ENCOUNTER (OUTPATIENT)
Dept: RADIOLOGY | Facility: HOSPITAL | Age: 49
Discharge: HOME | End: 2024-06-03
Payer: MEDICAID

## 2024-06-03 DIAGNOSIS — C50.912: ICD-10-CM

## 2024-06-03 DIAGNOSIS — C77.1: ICD-10-CM

## 2024-06-03 DIAGNOSIS — C77.3: ICD-10-CM

## 2024-06-03 PROCEDURE — G0279 TOMOSYNTHESIS, MAMMO: HCPCS | Performed by: RADIOLOGY

## 2024-06-03 PROCEDURE — 77062 BREAST TOMOSYNTHESIS BI: CPT

## 2024-06-03 PROCEDURE — 77066 DX MAMMO INCL CAD BI: CPT | Performed by: RADIOLOGY

## 2024-06-06 ENCOUNTER — TELEPHONE (OUTPATIENT)
Dept: HEMATOLOGY/ONCOLOGY | Facility: CLINIC | Age: 49
End: 2024-06-06
Payer: MEDICAID

## 2024-06-06 NOTE — PROGRESS NOTES
Subjective   Sapna Henriquez is a 48 y.o. female here for a follow-up visit.  She is doing well following surgery and has no breast complaints or concerns.  She has had a cough and was treated with antibiotics.  She now has some hearing loss in the left ear.  I have recommended that she follow-up with ENT.    Treatment history:  Stage ypT0 N0 M0 breast cancer.  Clinical Stage 2B (T2 N2) diagnosed in January 2023    Diagnosed with left breast cancer and a positive left axillary lymph node in January 2023.  2.2 cm left breast mass in the 3 o'clock position and 3 abnormal axillary lymph nodes seen on ultrasound.  On 1/10/2023 left breast ultrasound core biopsy yielded invasive ductal carcinoma, grade 2-3, ductal carcinoma in situ, high nuclear grade, focus suspicious for lymphovascular space invasion. Left axillary ultrasound-guided biopsy yielded metastatic carcinoma involving cores of lymphoid tissue, ER +95%, RI +70%, HER2 equivocal not amplified by dual-maliha, MammaPrint high risk -0.154 luminal B type.  Clinical Stage 2B (T2 N2)  Pre-treatment CT showed a prominent internal mammary LN.    She had neoadjuvant chemotherapy (ddACx4 and taxol x12; Dr. Best). Her last chemotherapy was August 11, 2023.    Left mag seed localized lumpectomy and sentinel lymph node biopsy on September 20, 2023.    She completed radiation therapy-whole breast and regional adriana radiation 4,800 cGy / 4,800 cGy (15 of 15 fractions) completed in January 2024    Anastrozole started January 2024     Mammogram:  Bilateral diagnostic mammogram on Ofelia 3, 2024:  Outer posterior depth surgical clips, a slightly more anterior and  inferior biopsy marker associated architectural distortion, diffuse  skin thickening and increased breast density on the left are new  since 07/23. No suspicious masses or calcifications are identified.  No significant change on the right since 12/22 is apparent.      IMPRESSION:  No mammographic or targeted sonographic  evidence of malignancy.      New left posttreatment changes should be followed in 6 months.      Imaging was personally reviewed and the findings discussed with the patient.    Objective     Physical Exam  Chest:          Comments: Lymph node exam shows no cervical, supraclavicular, or axillary lymphadenopathy.  Breast exam shows symmetric breasts bilaterally with no skin changes, no dominant masses and no nipple discharge in either breast.  Slight skin thickening in the central left breast.      Alert and oriented.      Assessment/Plan   Stage  ypT0 N0 M0  breast cancer.  Clinical Stage 2B (T2 N2) 1/23    Diagnosed with left breast cancer and a positive left axillary lymph node in January 2023.  2.2 cm left breast mass in the 3 o'clock position and 3 abnormal axillary lymph nodes seen on ultrasound.  On 1/10/2023 left breast ultrasound core biopsy yielded invasive ductal carcinoma, grade 2-3, ductal carcinoma in situ, high nuclear grade, focus suspicious for lymphovascular space invasion. Left axillary ultrasound-guided biopsy yielded metastatic carcinoma involving cores of lymphoid tissue, ER +95%, OR +70%, HER2 equivocal not amplified by dual-maliha, MammaPrint high risk -0.154 luminal B type.  Clinical Stage 2B (T2 N2)    Doing well.  Normal breast exam and bilateral mammogram.  No evidence of cancer recurrence.  Continue anastrozole   Continue follow-up with medical oncology.    Follow-up with me in December 2024 with a left mammogram.      Carol Rojas MD

## 2024-06-18 ENCOUNTER — APPOINTMENT (OUTPATIENT)
Dept: SURGICAL ONCOLOGY | Facility: CLINIC | Age: 49
End: 2024-06-18
Payer: MEDICAID

## 2024-06-18 VITALS
BODY MASS INDEX: 22.42 KG/M2 | WEIGHT: 111.2 LBS | HEART RATE: 59 BPM | DIASTOLIC BLOOD PRESSURE: 76 MMHG | SYSTOLIC BLOOD PRESSURE: 117 MMHG | RESPIRATION RATE: 18 BRPM | TEMPERATURE: 97.9 F | HEIGHT: 59 IN

## 2024-06-18 DIAGNOSIS — Z17.0 MALIGNANT NEOPLASM OF UPPER-OUTER QUADRANT OF LEFT BREAST IN FEMALE, ESTROGEN RECEPTOR POSITIVE (MULTI): Primary | ICD-10-CM

## 2024-06-18 DIAGNOSIS — C50.412 MALIGNANT NEOPLASM OF UPPER-OUTER QUADRANT OF LEFT BREAST IN FEMALE, ESTROGEN RECEPTOR POSITIVE (MULTI): Primary | ICD-10-CM

## 2024-06-18 PROCEDURE — 99213 OFFICE O/P EST LOW 20 MIN: CPT | Performed by: SURGERY

## 2024-06-18 PROCEDURE — 1036F TOBACCO NON-USER: CPT | Performed by: SURGERY

## 2024-06-18 ASSESSMENT — ENCOUNTER SYMPTOMS
LOSS OF SENSATION IN FEET: 0
DEPRESSION: 0
OCCASIONAL FEELINGS OF UNSTEADINESS: 0

## 2024-06-18 ASSESSMENT — COLUMBIA-SUICIDE SEVERITY RATING SCALE - C-SSRS
2. HAVE YOU ACTUALLY HAD ANY THOUGHTS OF KILLING YOURSELF?: NO
1. IN THE PAST MONTH, HAVE YOU WISHED YOU WERE DEAD OR WISHED YOU COULD GO TO SLEEP AND NOT WAKE UP?: NO
6. HAVE YOU EVER DONE ANYTHING, STARTED TO DO ANYTHING, OR PREPARED TO DO ANYTHING TO END YOUR LIFE?: NO

## 2024-06-18 ASSESSMENT — PAIN SCALES - GENERAL: PAINLEVEL: 0-NO PAIN

## 2024-07-09 ENCOUNTER — APPOINTMENT (OUTPATIENT)
Dept: SURGICAL ONCOLOGY | Facility: CLINIC | Age: 49
End: 2024-07-09
Payer: MEDICAID

## 2024-07-23 NOTE — PROGRESS NOTES
Patient ID: Sapna Henriquez is a 48 y.o. female.    The patient presents to clinic today for her history of breast cancer.     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, estrogen receptor positive (Multi)  Staging form: Breast, AJCC 8th Edition  - Clinical stage from 10/25/2023: Stage IIIB (cT2, cN3b, cM0, G3, ER+, CO+, HER2: Equivocal) - Signed by Starla Calles DO on 10/25/2023  - Pathologic stage from 10/25/2023: No Stage Recommended (ypT0, pN0, cM0) - Signed by Starla Calles DO on 10/25/2023        Diagnostic/Therapeutic History:    On 12/21/2022 she had diagnostic mammogram that was done that showed heterogeneously dense breast tissue with a focal symptom marker overlying the outer left breast  at anterior depth, adjacent to the marker is an irregular increased density mass with architectural distortion.  BI-RADS Category 5   Ultrasound: This corresponds with an area of the lump localized at the 3 o'clock position of the left breast 4 cm from the nipple there was a 2.2 x 2.2 x 1.1 cm lobulated irregularly irregular hypoechoic mass with multiple abnormal appearing left axillary  lymph nodes.      On 1/10/2023 she had left breast ultrasound-guided core needle biopsy showed invasive ductal carcinoma, grade 2-3, DCIS, high nuclear grade ER 95%, CO 70%, HER2 2+, nonamplified by dual ROMERO MammaPrint index: -0.154, high risk luminal B left axilla ultrasound-guided  core needle biopsy showed metastatic carcinoma       CT C/A/P showed focal breast tissue nodularity and prominent left axillary LN in addition to left Internal mammary LN with no evidence of distant disease; no evidence of bone mets on bone scan     8/11/2023: completed neoadjuvant chemotherapy with ddAC followed by weekly taxol    09/20/2023: Dr Rojas Performed a left PM and SLNB (0/3) that showed a complete pathological response     History of Present Illness (HPI)/Interval History:  Ms. Henriquez presents for presents today for  "follow-up, treatment and surveillance. She is on anastrozole.     She has new left arm swelling and pain from shoulder to elbow x 1 maliha months. 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   Denies any fever or chills.     She denies any chest pain or breathing issues.     She denies any vision changes or headache issues, dizziness, loss of balance. She did have hearing loss a few months back after falling in the shower from low blood sugar.     She denies any new or unexplained bone aches or pains.  Generalized bone pain due to anastrozole.     She denies any skin lesions or masses, hair loss, nail changes, or oral sores.    She report a \"crappy\" appetite due to pain makes her nauseated.     PMH:  No PMH , horseshoe kidney     PSH: tubal ligation      Allergies: NKDA     No meds     Reproductive hx: menarche 14, 9 years ago no menses since ligation, , 2nd one who got , OCP x couple years, No HRT     Family hx: mom at age 41 with breast cancer, grandma breast cancer and ovarian cancer  at 73, galindodma breast cancer  at 58, uncle for mom’s side colon cancer, grandfather on mother side has skin cancer     Social hx: non smoker, alcohol twice a week          Review of Systems:  14-point ROS otherwise negative, as per HPI.    No past medical history on file.    Past Surgical History:   Procedure Laterality Date    BREAST LUMPECTOMY Left     ENDOMETRIAL ABLATION      TUBAL LIGATION         Social History     Socioeconomic History    Marital status: Legally    Tobacco Use    Smoking status: Never     Passive exposure: Never    Smokeless tobacco: Never   Vaping Use    Vaping status: Never Used   Substance and Sexual Activity    Alcohol use: Yes     Alcohol/week: 4.0 standard drinks of alcohol     Types: 4 Shots of liquor per week    Drug use: Never     Social Determinants of Health     Financial Resource Strain: Not on File (2022)    Received from [facility]    " Financial Resource Strain     Financial Resource Strain: 0   Food Insecurity: No Food Insecurity (2023)    Hunger Vital Sign     Worried About Running Out of Food in the Last Year: Never true     Ran Out of Food in the Last Year: Never true   Transportation Needs: Not on File (2022)    Received from [facility] [employer]    Transportation Needs     Transportation: 0   Physical Activity: Not on File (2022)    Received from [facility]    Physical Activity     Physical Activity: 0   Stress: Not on File (2022)    Received from [facility]    Stress     Stress: 0   Social Connections: Not on File (2022)    Received from [facility]    Social Connections     Social Connections and Isolation: 0   Housing Stability: Not on File (2022)    Received from [facility]    Housing Stability     Housin       No Known Allergies      Current Outpatient Medications:     anastrozole (Arimidex) 1 mg tablet, Take 1 tablet (1 mg total) by mouth once daily.  Swallow whole with a drink of water., Disp: 90 tablet, Rfl: 3    dextromethorphan-guaifenesin (Mucinex DM)  mg 12 hr tablet, Take 1 tablet by mouth every 12 hours. Do not crush, chew, or split., Disp: , Rfl:     dicyclomine (Bentyl) 10 mg capsule, Take 1 capsule (10 mg) by mouth 4 times a day as needed., Disp: , Rfl:     gabapentin (Neurontin) 100 mg capsule, Take 1 capsule (100 mg) by mouth if needed., Disp: , Rfl:     methocarbamol (Robaxin) 500 mg tablet, Take 1 tablet (500 mg) by mouth 4 times a day as needed., Disp: , Rfl:     penicillin v potassium (Veetid) 500 mg tablet, Take 1 tablet (500 mg) by mouth 4 times a day., Disp: , Rfl:     sucralfate (Carafate) 1 gram tablet, Take 1 tablet (1 g) by mouth 4 times a day before meals. Dissolve tablet in 3 teaspoons of water (Patient not taking: Reported on 2024), Disp: 120 tablet, Rfl: 11    SUMAtriptan (Imitrex) 50 mg tablet, 1 tablet PO for severe headache, may repeat in 2 hrs(max 200 mg/24 hrs) Orally, Disp: , Rfl:      venlafaxine XR (Effexor XR) 37.5 mg 24 hr capsule, Take 1 capsule (37.5 mg) by mouth once daily. Do not crush or chew. (Patient not taking: Reported on 2023), Disp: 30 capsule, Rfl: 2     Objective    BSA: There is no height or weight on file to calculate BSA.  There were no vitals taken for this visit.    Performance Status:  The ECOG performance scale today is ECO- Restricted in physically strenuous activity.  Carries out light duty.     Physical Exam  Vitals reviewed.   Constitutional:       General: She is awake. She is not in acute distress.     Appearance: Normal appearance. She is not ill-appearing.   HENT:      Mouth/Throat:      Pharynx: Oropharynx is clear. No oropharyngeal exudate.   Eyes:      General: No scleral icterus.     Conjunctiva/sclera: Conjunctivae normal.   Neck:      Trachea: Trachea and phonation normal. No tracheal tenderness.   Cardiovascular:      Rate and Rhythm: Normal rate and regular rhythm.      Heart sounds: No murmur heard.     No friction rub. No gallop.   Pulmonary:      Effort: Pulmonary effort is normal. No respiratory distress.      Breath sounds: Normal breath sounds. No stridor. No wheezing, rhonchi or rales.   Chest:      Chest wall: No mass, lacerations, deformity or tenderness.   Breasts:     Right: No swelling, mass, nipple discharge, skin change or tenderness.      Left: Swelling and tenderness present. No mass, nipple discharge or skin change.      Comments: Diffuse tenderness of the left breast along with left axilla and upper arm   Abdominal:      General: Abdomen is flat. There is no distension.      Palpations: Abdomen is soft. There is no mass.      Tenderness: There is no abdominal tenderness.   Lymphadenopathy:      Cervical: No cervical adenopathy.      Upper Body:      Right upper body: No supraclavicular, axillary or pectoral adenopathy.      Left upper body: No supraclavicular, axillary or pectoral adenopathy.   Skin:     General: Skin is warm and  dry.      Coloration: Skin is not jaundiced.      Findings: No lesion or rash.   Neurological:      General: No focal deficit present.      Mental Status: She is alert and oriented to person, place, and time.      Motor: No weakness.      Gait: Gait normal.   Psychiatric:         Mood and Affect: Mood normal.         Thought Content: Thought content normal.         Judgment: Judgment normal.         Laboratory Data:  Lab Results   Component Value Date    WBC 4.8 08/11/2023    HGB 12.7 08/11/2023    HCT 37.5 08/11/2023    MCV 91 08/11/2023     08/11/2023    ANC 6.42 04/28/2023       Chemistry    Lab Results   Component Value Date/Time     08/11/2023 1018    K 3.7 08/11/2023 1018     08/11/2023 1018    CO2 25 08/11/2023 1018    BUN 13 08/11/2023 1018    CREATININE 0.70 08/11/2023 1018    Lab Results   Component Value Date/Time    CALCIUM 9.7 08/11/2023 1018    ALKPHOS 79 08/11/2023 1018    AST 25 08/11/2023 1018    ALT 38 08/11/2023 1018    BILITOT 0.6 08/11/2023 1018             Radiology:  BI mammo bilateral diagnostic tomosynthesis  Narrative: Interpreted By:  Naresh Aviles,   STUDY:  BI MAMMO BILATERAL DIAGNOSTIC TOMOSYNTHESIS;  6/3/2024 9:22 am      ACCESSION NUMBER(S):  [id number]      ORDERING CLINICIAN:  FATIMAH MELTON      INDICATION:  The 01/10/2023 ultrasound-guided core biopsies of a left breast mass  and an axillary lymph node showed grade 2-3 invasive ductal carcinoma  metastatic to the axillary node and DCIS. A left lumpectomy was  performed on 09/20/2023. The patient also gives a history of  chemotherapy, radiation, a mother with breast cancer and a maternal  grandmother with breast and ovarian cancer.      COMPARISON:  07/10/2023 left and 12/21/2022 bilateral diagnostic mammograms with  tomosynthesis.      FINDINGS:  BILATERAL DIAGNOSTIC MAMMOGRAM WITH TOMOSYNTHESIS:      2D and tomosynthesis images were reviewed at 1 mm slice thickness.      Density:  There are areas of  scattered fibroglandular tissue.      Outer posterior depth surgical clips, a slightly more anterior and  inferior biopsy marker associated architectural distortion, diffuse  skin thickening and increased breast density on the left are new  since 07/23. No suspicious masses or calcifications are identified.  No significant change on the right since 12/22 is apparent.      Impression: No mammographic or targeted sonographic evidence of malignancy.      New left posttreatment changes should be followed in 6 months.      BI-RADS CATEGORY:  BI-RADS Category:  3 Probably Benign.  Recommendation:  Short-term Interval Follow-up Imaging.  Recommended Date:  6 Months.  Laterality:  Left.      For any future breast imaging appointments, please call [phone]).          MACRO:  None      Signed by: Naresh Aviles 6/3/2024 5:27 PM  Dictation workstation:   [id number]       No results found for this or any previous visit from the past 365 days.          Assessment/Plan:      47 year old female overall medically healthy with recent diagnosis of left sided cT2N1 IDC ER 95%, MS 70%, HER2 2+, nonamplified by dual ROMERO MammaPrint index: -0.154, high risk luminal  B left axilla ultrasound-guided core needle biopsy showed metastatic carcinoma, discussed in TB with plan for neoadjuvant chemotherapy      On 12/21/2022 she had diagnostic mammogram that was done that showed heterogeneously dense breast tissue with a focal symptom marker overlying the outer left breast at anterior depth, adjacent to the marker is an irregular increased density mass with  architectural distortion.  BI-RADS Category 5   Ultrasound: This corresponds with an area of the lump localized at the 3 o'clock position of the left breast 4 cm from the nipple there was a 2.2 x 2.2 x 1.1 cm lobulated irregularly  irregular hypoechoic mass with multiple abnormal appearing left axillary lymph nodes.      On 1/10/2023 she had left breast ultrasound-guided core  needle biopsy showed invasive ductal carcinoma, grade 2-3, DCIS, high nuclear grade ER 95%, IA 70%, HER2 2+, nonamplified by dual ROMERO MammaPrint index: -0.154, high risk luminal B left axilla ultrasound-guided  core needle biopsy showed metastatic carcinoma     I reviewed with her the events that led to her diagnosis of breast cancer. We reviewed all the procedures and diagnostic imaging she underwent thus far. I discussed the features of her breast cancer that include the size, grade, lymph node status and  hormone receptor/ her2-xin status.     CT C/A/P showed focal breast tissue nodularity and prominent left axillary LN in addition to left Internal mammary LN with no evidence of distant disease; no evidence of bone mets on bone scan     8/11/2023: completed neoadjuvant chemotherapy with ddAC followed by weekly taxol    09/20/2023: Dr Rojas Performed a left PM and SLNB (0/3) that showed a complete pathological response     - s/p Whole breast and regional adriana radiation to a dose of 42.56 Gy in 16 fractions with a simultaneous integrated boost to the positive intramammary node and lumpectomy bed    Started on anastrozole and Did have hot flushes, quite severe as well as bony pain. Pauses anastrozole and restarted and symptoms did recur. Recent lab panel: estradiol<19, FSH: 131  Generalized arthralgias: will do a 2 week pause on anastrozole then discussed switch to exemestane   Left breast/axilla/upper extremity swelling: STAT PET scan ordered for further work up   Grade 2 fatigue: PET scan as above. Labs - Alk phos 150 and elevated ferritin.   - next 6 month left breast follow up in Dec 2024     Fibroids  Pelvic/Uterine/Ovarian US: Fibroid in the right lower uterine body and Probable hemorrhagic corpus luteum left ovary.   - Follow-up with GYN      Thyroid nodules  Thyroid US: Right thyroid lobe 1.5 cm and left thyroid lobe 0.6 cm TIRADS 2 mixed solid and cystic nodules which are likely benign   - Likely benign no  follow-up imaging recommended   - Defer to PCP for continued monitoring     RTC in 4 months with Judy Cosme PA-C if not needed sooner     Judy Cosme PA-C  Hematology and Medical Oncology  [facility]

## 2024-07-25 ENCOUNTER — LAB (OUTPATIENT)
Dept: LAB | Facility: HOSPITAL | Age: 49
End: 2024-07-25
Payer: MEDICAID

## 2024-07-25 ENCOUNTER — OFFICE VISIT (OUTPATIENT)
Dept: HEMATOLOGY/ONCOLOGY | Facility: HOSPITAL | Age: 49
End: 2024-07-25
Payer: MEDICAID

## 2024-07-25 VITALS
HEIGHT: 59 IN | TEMPERATURE: 97.7 F | HEART RATE: 65 BPM | SYSTOLIC BLOOD PRESSURE: 129 MMHG | WEIGHT: 116.18 LBS | BODY MASS INDEX: 23.42 KG/M2 | DIASTOLIC BLOOD PRESSURE: 78 MMHG | RESPIRATION RATE: 16 BRPM | OXYGEN SATURATION: 98 %

## 2024-07-25 DIAGNOSIS — Z17.0 MALIGNANT NEOPLASM OF UPPER-OUTER QUADRANT OF LEFT BREAST IN FEMALE, ESTROGEN RECEPTOR POSITIVE (MULTI): Primary | ICD-10-CM

## 2024-07-25 DIAGNOSIS — R53.83 OTHER FATIGUE: ICD-10-CM

## 2024-07-25 DIAGNOSIS — C50.412 MALIGNANT NEOPLASM OF UPPER-OUTER QUADRANT OF LEFT BREAST IN FEMALE, ESTROGEN RECEPTOR POSITIVE (MULTI): Primary | ICD-10-CM

## 2024-07-25 DIAGNOSIS — M79.89 SWELLING OF LEFT UPPER EXTREMITY: ICD-10-CM

## 2024-07-25 DIAGNOSIS — M79.622 PAIN IN AXILLA, LEFT: ICD-10-CM

## 2024-07-25 DIAGNOSIS — R52 GENERALIZED PAIN: ICD-10-CM

## 2024-07-25 LAB
25(OH)D3 SERPL-MCNC: 57 NG/ML (ref 30–100)
ALBUMIN SERPL BCP-MCNC: 4.7 G/DL (ref 3.4–5)
ALP SERPL-CCNC: 150 U/L (ref 33–110)
ALT SERPL W P-5'-P-CCNC: 16 U/L (ref 7–45)
ANION GAP SERPL CALC-SCNC: 13 MMOL/L (ref 10–20)
AST SERPL W P-5'-P-CCNC: 17 U/L (ref 9–39)
BASOPHILS # BLD AUTO: 0.03 X10*3/UL (ref 0–0.1)
BASOPHILS NFR BLD AUTO: 0.7 %
BILIRUB SERPL-MCNC: 0.8 MG/DL (ref 0–1.2)
BUN SERPL-MCNC: 16 MG/DL (ref 6–23)
CALCIUM SERPL-MCNC: 10.2 MG/DL (ref 8.6–10.3)
CHLORIDE SERPL-SCNC: 103 MMOL/L (ref 98–107)
CO2 SERPL-SCNC: 29 MMOL/L (ref 21–32)
CREAT SERPL-MCNC: 0.84 MG/DL (ref 0.5–1.05)
EGFRCR SERPLBLD CKD-EPI 2021: 86 ML/MIN/1.73M*2
EOSINOPHIL # BLD AUTO: 0.36 X10*3/UL (ref 0–0.7)
EOSINOPHIL NFR BLD AUTO: 8.6 %
ERYTHROCYTE [DISTWIDTH] IN BLOOD BY AUTOMATED COUNT: 13.2 % (ref 11.5–14.5)
FERRITIN SERPL-MCNC: 195 NG/ML (ref 8–150)
FOLATE SERPL-MCNC: 10.5 NG/ML
GLUCOSE SERPL-MCNC: 88 MG/DL (ref 74–99)
HCT VFR BLD AUTO: 43.1 % (ref 36–46)
HGB BLD-MCNC: 14.5 G/DL (ref 12–16)
IMM GRANULOCYTES # BLD AUTO: 0.01 X10*3/UL (ref 0–0.7)
IMM GRANULOCYTES NFR BLD AUTO: 0.2 % (ref 0–0.9)
IRON SATN MFR SERPL: 19 % (ref 25–45)
IRON SERPL-MCNC: 77 UG/DL (ref 35–150)
LYMPHOCYTES # BLD AUTO: 0.44 X10*3/UL (ref 1.2–4.8)
LYMPHOCYTES NFR BLD AUTO: 10.5 %
MCH RBC QN AUTO: 30 PG (ref 26–34)
MCHC RBC AUTO-ENTMCNC: 33.6 G/DL (ref 32–36)
MCV RBC AUTO: 89 FL (ref 80–100)
MONOCYTES # BLD AUTO: 0.5 X10*3/UL (ref 0.1–1)
MONOCYTES NFR BLD AUTO: 11.9 %
NEUTROPHILS # BLD AUTO: 2.85 X10*3/UL (ref 1.2–7.7)
NEUTROPHILS NFR BLD AUTO: 68.1 %
NRBC BLD-RTO: 0 /100 WBCS (ref 0–0)
PLATELET # BLD AUTO: 179 X10*3/UL (ref 150–450)
POTASSIUM SERPL-SCNC: 4.2 MMOL/L (ref 3.5–5.3)
PROT SERPL-MCNC: 7.5 G/DL (ref 6.4–8.2)
RBC # BLD AUTO: 4.83 X10*6/UL (ref 4–5.2)
SODIUM SERPL-SCNC: 141 MMOL/L (ref 136–145)
TIBC SERPL-MCNC: 407 UG/DL (ref 240–445)
TSH SERPL-ACNC: 1.42 MIU/L (ref 0.44–3.98)
UIBC SERPL-MCNC: 330 UG/DL (ref 110–370)
VIT B12 SERPL-MCNC: 415 PG/ML (ref 211–911)
WBC # BLD AUTO: 4.2 X10*3/UL (ref 4.4–11.3)

## 2024-07-25 PROCEDURE — 85025 COMPLETE CBC W/AUTO DIFF WBC: CPT

## 2024-07-25 PROCEDURE — 82607 VITAMIN B-12: CPT

## 2024-07-25 PROCEDURE — 99215 OFFICE O/P EST HI 40 MIN: CPT

## 2024-07-25 PROCEDURE — 82306 VITAMIN D 25 HYDROXY: CPT

## 2024-07-25 PROCEDURE — 84443 ASSAY THYROID STIM HORMONE: CPT

## 2024-07-25 PROCEDURE — 3008F BODY MASS INDEX DOCD: CPT

## 2024-07-25 PROCEDURE — 82728 ASSAY OF FERRITIN: CPT

## 2024-07-25 PROCEDURE — 83540 ASSAY OF IRON: CPT

## 2024-07-25 PROCEDURE — 84075 ASSAY ALKALINE PHOSPHATASE: CPT

## 2024-07-25 PROCEDURE — 82746 ASSAY OF FOLIC ACID SERUM: CPT

## 2024-07-25 PROCEDURE — 36415 COLL VENOUS BLD VENIPUNCTURE: CPT

## 2024-07-25 RX ORDER — EXEMESTANE 25 MG/1
25 TABLET ORAL DAILY
Qty: 30 TABLET | Refills: 1 | Status: SHIPPED | OUTPATIENT
Start: 2024-07-25 | End: 2025-07-25

## 2024-07-25 ASSESSMENT — PAIN SCALES - GENERAL: PAINLEVEL: 8

## 2024-07-26 ENCOUNTER — HOSPITAL ENCOUNTER (OUTPATIENT)
Dept: RADIOLOGY | Facility: CLINIC | Age: 49
Discharge: HOME | End: 2024-07-26
Payer: MEDICAID

## 2024-07-26 DIAGNOSIS — M79.622 PAIN IN AXILLA, LEFT: ICD-10-CM

## 2024-07-26 DIAGNOSIS — C50.412 MALIGNANT NEOPLASM OF UPPER-OUTER QUADRANT OF LEFT BREAST IN FEMALE, ESTROGEN RECEPTOR POSITIVE (MULTI): ICD-10-CM

## 2024-07-26 DIAGNOSIS — R52 GENERALIZED PAIN: ICD-10-CM

## 2024-07-26 DIAGNOSIS — R53.83 OTHER FATIGUE: ICD-10-CM

## 2024-07-26 DIAGNOSIS — Z17.0 MALIGNANT NEOPLASM OF UPPER-OUTER QUADRANT OF LEFT BREAST IN FEMALE, ESTROGEN RECEPTOR POSITIVE (MULTI): ICD-10-CM

## 2024-07-26 DIAGNOSIS — M79.89 SWELLING OF LEFT UPPER EXTREMITY: ICD-10-CM

## 2024-07-26 PROCEDURE — A9552 F18 FDG: HCPCS

## 2024-07-26 PROCEDURE — 3430000001 HC RX 343 DIAGNOSTIC RADIOPHARMACEUTICALS

## 2024-07-26 PROCEDURE — 78816 PET IMAGE W/CT FULL BODY: CPT

## 2024-07-26 RX ORDER — FLUDEOXYGLUCOSE F 18 200 MCI/ML
14.64 INJECTION, SOLUTION INTRAVENOUS
Status: COMPLETED | OUTPATIENT
Start: 2024-07-26 | End: 2024-07-26

## 2024-07-29 ENCOUNTER — TELEPHONE (OUTPATIENT)
Dept: HEMATOLOGY/ONCOLOGY | Facility: HOSPITAL | Age: 49
End: 2024-07-29
Payer: MEDICAID

## 2024-07-29 DIAGNOSIS — I89.0 LYMPHEDEMA OF LEFT ARM: ICD-10-CM

## 2024-07-29 DIAGNOSIS — C50.412 MALIGNANT NEOPLASM OF UPPER-OUTER QUADRANT OF LEFT BREAST IN FEMALE, ESTROGEN RECEPTOR POSITIVE (MULTI): ICD-10-CM

## 2024-07-29 DIAGNOSIS — I89.0 LYMPHEDEMA OF BREAST: Primary | ICD-10-CM

## 2024-07-29 DIAGNOSIS — R74.8 ELEVATED ALKALINE PHOSPHATASE LEVEL: Primary | ICD-10-CM

## 2024-07-29 DIAGNOSIS — Z17.0 MALIGNANT NEOPLASM OF UPPER-OUTER QUADRANT OF LEFT BREAST IN FEMALE, ESTROGEN RECEPTOR POSITIVE (MULTI): ICD-10-CM

## 2024-07-29 DIAGNOSIS — R79.89 ELEVATED FERRITIN: ICD-10-CM

## 2024-07-29 NOTE — TELEPHONE ENCOUNTER
Pt returned call. Informed her of information regarding labs and CT PET (see [employer] message with details). Will plan to also recheck CBC, ferritin and CMP in 3 months for monitoring of alk phos and ferritin. Pain is a bit better since being off anastrozole; trying home treatment options. Referral placed. No further questions or concerns

## 2024-07-29 NOTE — TELEPHONE ENCOUNTER
Attempted to call pt regarding labs and CT PET however no answer and mail box id full. Will send [employer] message.

## 2024-08-09 ENCOUNTER — APPOINTMENT (OUTPATIENT)
Dept: HEMATOLOGY/ONCOLOGY | Facility: HOSPITAL | Age: 49
End: 2024-08-09
Payer: MEDICAID

## 2024-08-16 ENCOUNTER — HOSPITAL ENCOUNTER (EMERGENCY)
Facility: HOSPITAL | Age: 49
Discharge: HOME | End: 2024-08-16
Attending: FAMILY MEDICINE
Payer: MEDICAID

## 2024-08-16 VITALS
HEIGHT: 59 IN | OXYGEN SATURATION: 95 % | SYSTOLIC BLOOD PRESSURE: 118 MMHG | DIASTOLIC BLOOD PRESSURE: 79 MMHG | WEIGHT: 115 LBS | HEART RATE: 76 BPM | BODY MASS INDEX: 23.18 KG/M2 | TEMPERATURE: 97.9 F | RESPIRATION RATE: 20 BRPM

## 2024-08-16 DIAGNOSIS — T78.40XA ALLERGIC REACTION, INITIAL ENCOUNTER: ICD-10-CM
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
## 2024-08-16 PROCEDURE — 2500000004 HC RX 250 GENERAL PHARMACY W/ HCPCS (ALT 636 FOR OP/ED): Mod: SE | Performed by: FAMILY MEDICINE

## 2024-08-16 PROCEDURE — 96374 THER/PROPH/DIAG INJ IV PUSH: CPT

## 2024-08-16 PROCEDURE — 96375 TX/PRO/DX INJ NEW DRUG ADDON: CPT

## 2024-08-16 PROCEDURE — 99284 EMERGENCY DEPT VISIT MOD MDM: CPT | Mod: 25

## 2024-08-16 RX ORDER — DIPHENHYDRAMINE HYDROCHLORIDE 50 MG/ML
INJECTION INTRAMUSCULAR; INTRAVENOUS
Status: COMPLETED
Start: 2024-08-16 | End: 2024-08-16

## 2024-08-16 RX ORDER — DIPHENHYDRAMINE HYDROCHLORIDE 50 MG/ML
50 INJECTION INTRAMUSCULAR; INTRAVENOUS ONCE
Status: COMPLETED | OUTPATIENT
Start: 2024-08-16 | End: 2024-08-16

## 2024-08-16 RX ORDER — FAMOTIDINE 10 MG/ML
20 INJECTION INTRAVENOUS ONCE
Status: COMPLETED | OUTPATIENT
Start: 2024-08-16 | End: 2024-08-16

## 2024-08-16 RX ORDER — FAMOTIDINE 10 MG/ML
INJECTION INTRAVENOUS
Status: COMPLETED
Start: 2024-08-16 | End: 2024-08-16

## 2024-08-16 RX ORDER — METHYLPREDNISOLONE 4 MG/1
TABLET ORAL
Qty: 21 TABLET | Refills: 0 | Status: SHIPPED | OUTPATIENT
Start: 2024-08-16 | End: 2024-08-23

## 2024-08-16 ASSESSMENT — PAIN SCALES - GENERAL
PAINLEVEL_OUTOF10: 0 - NO PAIN
PAINLEVEL_OUTOF10: 10 - WORST POSSIBLE PAIN
PAINLEVEL_OUTOF10: 10 - WORST POSSIBLE PAIN

## 2024-08-16 ASSESSMENT — PAIN DESCRIPTION - PROGRESSION: CLINICAL_PROGRESSION: NOT CHANGED

## 2024-08-16 ASSESSMENT — PAIN - FUNCTIONAL ASSESSMENT: PAIN_FUNCTIONAL_ASSESSMENT: 0-10

## 2024-08-16 ASSESSMENT — COLUMBIA-SUICIDE SEVERITY RATING SCALE - C-SSRS
6. HAVE YOU EVER DONE ANYTHING, STARTED TO DO ANYTHING, OR PREPARED TO DO ANYTHING TO END YOUR LIFE?: NO
1. IN THE PAST MONTH, HAVE YOU WISHED YOU WERE DEAD OR WISHED YOU COULD GO TO SLEEP AND NOT WAKE UP?: NO
2. HAVE YOU ACTUALLY HAD ANY THOUGHTS OF KILLING YOURSELF?: NO

## 2024-08-17 NOTE — ED PROVIDER NOTES
HPI   Chief Complaint   Patient presents with    Insect Bite     Pt walks in with allergic reaction to bee stings on her R tricep. Pt has hives all over her body and a scratchy throat as well as swollen eyes.        48-year-old female comes the ED with complaint of bee sting that occurred just prior to arrival.  Patient reports after being stung she began having some swelling and then shortly thereafter broke out in hives all over her body.  Patient reports he took an oral dose of Benadryl and told family was brought to the ED for evaluation.  Patient in the ED is alert, cooperative, appeared anxious, uncomfortable, but in no distress.  Patient reports itching all over with the rash and some swelling across her eyes with some itchiness in her throat.      History provided by:  Patient and medical records   used: No            Patient History   History reviewed. No pertinent past medical history.  Past Surgical History:   Procedure Laterality Date    BREAST LUMPECTOMY Left     ENDOMETRIAL ABLATION      TUBAL LIGATION       Family History   Problem Relation Name Age of Onset    Breast cancer Mother      Breast cancer Maternal Grandmother      Ovarian cancer Maternal Grandmother      Breast cancer Maternal Great-Grandfather       Social History     Tobacco Use    Smoking status: Never     Passive exposure: Never    Smokeless tobacco: Never   Vaping Use    Vaping status: Never Used   Substance Use Topics    Alcohol use: Not Currently     Alcohol/week: 4.0 standard drinks of alcohol     Types: 4 Shots of liquor per week    Drug use: Never       Physical Exam   ED Triage Vitals   Temperature Heart Rate Respirations BP   08/16/24 2117 08/16/24 2117 08/16/24 2117 08/16/24 2117   36.6 °C (97.9 °F) (!) 102 14 (!) 142/96      Pulse Ox Temp Source Heart Rate Source Patient Position   08/16/24 2115 08/16/24 2117 -- --   98 % Tympanic        BP Location FiO2 (%)     08/16/24 2117 --     Right arm         Physical Exam  Vitals and nursing note reviewed.   Constitutional:       General: She is not in acute distress.     Appearance: She is well-developed.   HENT:      Head: Normocephalic and atraumatic.   Eyes:      Conjunctiva/sclera: Conjunctivae normal.   Cardiovascular:      Rate and Rhythm: Normal rate and regular rhythm.      Heart sounds: No murmur heard.  Pulmonary:      Effort: Pulmonary effort is normal. No respiratory distress.      Breath sounds: Normal breath sounds.   Abdominal:      Palpations: Abdomen is soft.      Tenderness: There is no abdominal tenderness.   Musculoskeletal:         General: No swelling.      Cervical back: Neck supple.   Skin:     General: Skin is warm and dry.      Capillary Refill: Capillary refill takes less than 2 seconds.      Findings: Rash present. Rash is urticarial.   Neurological:      Mental Status: She is alert.   Psychiatric:         Mood and Affect: Mood normal.           ED Course & MDM   Diagnoses as of 08/17/24 0321   Insect bite, unspecified site, initial encounter   Allergic reaction, initial encounter                 No data recorded     Cindy Coma Scale Score: 15 (08/16/24 2146 : Charlee Fernando RN)                           Medical Decision Making  Patient upon arrival to the ED appeared to be anxious and uncomfortable but in no distress stable vital signs.  Discussed with patient her presenting complaints and clinical findings.  Reviewed with patient her epic chart and counseled patient on allergic reactions and appropriate approach to management/treatments.  After assessment and evaluation findings appear to be consistent with allergic reaction secondary to a bee sting and patient was given IV Benadryl, IV Pepcid, IV Solu-Medrol, and observed.  After treatment and a period of rest patient was reassessed finally feeling much better, no longer felt swelling in her face, no longer had scratchiness in her throat, hives had nearly resolved, patient had  no new physical complaints, and vital signs appear to be stable.  At this time patient was educated the prescription provided for home for continued management of allergic reaction, advised to contact her primary care doctor for follow-up recheck in several days, and patient discharged home with family.    Amount and/or Complexity of Data Reviewed  External Data Reviewed: labs, radiology and notes.    Risk  OTC drugs.  Prescription drug management.        Procedure  Procedures     Randal Velasco MD  08/17/24 0325

## 2024-08-17 NOTE — ED TRIAGE NOTES
Pt walks in with allergic reaction to bee stings on her R tricep. Pt has hives all over her body and a scratchy throat as well as swollen eyes.

## 2024-09-06 ENCOUNTER — HOSPITAL ENCOUNTER (OUTPATIENT)
Dept: RADIATION ONCOLOGY | Facility: CLINIC | Age: 49
Setting detail: RADIATION/ONCOLOGY SERIES
Discharge: HOME | End: 2024-09-06
Payer: MEDICAID

## 2024-09-06 VITALS
WEIGHT: 113.87 LBS | HEART RATE: 56 BPM | SYSTOLIC BLOOD PRESSURE: 107 MMHG | OXYGEN SATURATION: 100 % | RESPIRATION RATE: 16 BRPM | BODY MASS INDEX: 23 KG/M2 | DIASTOLIC BLOOD PRESSURE: 68 MMHG | TEMPERATURE: 97.2 F

## 2024-09-06 DIAGNOSIS — C50.812 MALIGNANT NEOPLASM OF OVERLAPPING SITES OF LEFT FEMALE BREAST (MULTI): ICD-10-CM

## 2024-09-06 PROCEDURE — 99213 OFFICE O/P EST LOW 20 MIN: CPT | Performed by: STUDENT IN AN ORGANIZED HEALTH CARE EDUCATION/TRAINING PROGRAM

## 2024-09-06 ASSESSMENT — PAIN SCALES - GENERAL: PAINLEVEL: 0-NO PAIN

## 2024-09-06 NOTE — PROGRESS NOTES
"Patient seen for follow up after 15fx to left breast ended 1.4.24. Patient reports no pain, no issues with ROM, feels that she is recovering well from RT. Said she started a different hormone medication and her \"bones don't hurt anymore\" but that her appetite is gone and she has lost weight. RN sent message to [facility] Joseph. Per Dr. Calles patient can follow up with this office as needed. Patient verbalizes understanding with verbal teach back.  "

## 2024-09-06 NOTE — PROGRESS NOTES
Radiation Oncology Follow-Up    Patient Name:  Sapna Henriquez  MRN:  [id number]  :  1975    Referring Provider: Starla Calles DO  Primary Care Provider: José Luis Zambrano DO  Care Team: Patient Care Team:  José Luis Zambrano DO as PCP - General  Stew Bustos MD as Primary Care Provider  Torsten Best MD as Consulting Physician (Hematology and Oncology)    Date of Service: 2024     Diagnosis: Malignant neoplasm of upper-outer quadrant of left breast, estrogen receptor positive (Multi), Clinical: Stage IIIB (cT2, cN3b, cM0, G3, ER+, KS+, HER2: Equivocal)  Malignant neoplasm of upper-outer quadrant of left breast, estrogen receptor positive (Multi), Pathologic: No Stage Recommended (ypT0, pN0, cM0)     Previous treatment:  2023: Completed neoadjuvant dose dense AC + T  2023: left breast lumpectomy and sentinel node biopsy    History of Present Illness:  Ms. Henriquez is a 48 y.o. woman, who returns to radiation oncology clinic today for follow-up. She had a repeat mammogram on 6/3/2024, which showed posttreatment changes in the left breast, but otherwise no evidence of malignancy bilaterally. She had a PET/CT 2024 due to multiple new areas of pain in the bones, which was negative for any abnormality suggestive of local regional recurrence or distant metastatic disease. Mild activity at her lumpectomy site was consistent with postsurgical changes. She is otherwise doing relatively well. She has some intermittent occasional numbness at her lumpectomy site, but otherwise denies any breast pain or discomfort. She denies any breast masses or lesions. She denies any lymphedema. She was recently switched to exemestane in July due to frequent hot flashes.         Treatment Rendered:   Radiation Treatments       Active   No active radiation treatments to show.     Completed   L breast_RNI (Started on 2023)   Most recent fraction: 320 cGy given on 2024   Total given: 4,800 cGy / 4,800 cGy  (15 of 15 fractions)    Elapsed Days: 23   Technique: VMAT                     Review of Systems:   Review of Systems - Oncology  The patient's current pain level was assessed.  They report currently having a pain of 4 out of 10.  They feel their pain is under control without the use of pain medications.    Performance Status:   The Karnofsky performance scale today is 90, Able to carry on normal activity; minor signs or symptoms of disease (ECOG equivalent 0).        OBJECTIVE  Vital Signs:  /68 (BP Location: Right arm, Patient Position: Sitting, BP Cuff Size: Adult)   Pulse 56   Temp 36.2 °C (97.2 °F) (Temporal)   Resp 16   Wt 51.6 kg (113 lb 13.9 oz)   SpO2 100%   BMI 23.00 kg/m²    Physical Exam:  Physical Exam  Vitals reviewed. Exam conducted with a chaperone present.   Constitutional:       General: She is not in acute distress.     Appearance: Normal appearance. She is not ill-appearing.   HENT:      Head: Normocephalic and atraumatic.      Right Ear: External ear normal.      Left Ear: External ear normal.      Mouth/Throat:      Mouth: Mucous membranes are moist.      Pharynx: Oropharynx is clear.   Eyes:      Conjunctiva/sclera: Conjunctivae normal.   Cardiovascular:      Rate and Rhythm: Normal rate.   Pulmonary:      Effort: Pulmonary effort is normal. No respiratory distress.   Chest:   Breasts:     Right: No swelling, bleeding, mass, skin change or tenderness.      Left: No swelling, bleeding, mass, skin change or tenderness.      Comments: The breasts were examined in the supine and upright positions, and are overall symmetrical in appearance. The left breast has a well-healed lumpectomy scar with residual mild hyperpigmentation. Otherwise, the remainder of the breast exam exhibits normal breast tissue in the right and left breasts, without any discrete firmness, nodularity, or lesions to palpation. There is no other tenderness or palpable masses. The overlying skin is otherwise normal. There is no appreciable  axillary lymphadenopathy in the right or left axilla. There is no appreciable lymphedema.  Abdominal:      General: There is no distension.   Musculoskeletal:         General: Normal range of motion.      Cervical back: Normal range of motion and neck supple.   Lymphadenopathy:      Upper Body:      Right upper body: No axillary adenopathy.      Left upper body: No axillary adenopathy.   Skin:     General: Skin is warm and dry.   Neurological:      Mental Status: She is alert and oriented to person, place, and time.   Psychiatric:         Mood and Affect: Mood normal.         Behavior: Behavior normal.             ASSESSMENT:  Ms. Henriquez is a 48 y.o. woman with a diagnosis of Malignant neoplasm of upper-outer quadrant of left breast, estrogen receptor positive (Multi), Clinical: Stage IIIB (cT2, cN3b, cM0, G3, ER+, NM+, HER2: Equivocal)  Malignant neoplasm of upper-outer quadrant of left breast, estrogen receptor positive (Multi), Pathologic: No Stage Recommended (ypT0, pN0, cM0), status post neoadjuvant chemotherapy, followed by lumpectomy and sentinel node biopsy, now status post whole breast and regional adriana radiation. She is clinically doing well, with no evidence of disease on today's exam or recent imaging. She has no significant posttreatment related toxicity, other than faint residual hyperpigmentation.    PLAN: As previously discussed, she would like to continue to follow-up with just her medical oncology and surgical teams, who will continue to manage her endocrine therapy and mammograms going forward. She is welcome to contact us at any point in future if additional concerns arise.    NCCN Guidelines were applicable to guide this patients treatment plan.  Starla Calles DO

## 2024-09-10 ENCOUNTER — TELEPHONE (OUTPATIENT)
Dept: HEMATOLOGY/ONCOLOGY | Facility: HOSPITAL | Age: 49
End: 2024-09-10
Payer: MEDICAID

## 2024-09-12 NOTE — PROGRESS NOTES
Patient ID: Sapna Henriquez is a 48 y.o. female.    The patient presents to clinic today for her history of breast cancer.     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, estrogen receptor positive (Multi)  Staging form: Breast, AJCC 8th Edition  - Clinical stage from 10/25/2023: Stage IIIB (cT2, cN3b, cM0, G3, ER+, MA+, HER2: Equivocal) - Signed by Starla Calles DO on 10/25/2023  - Pathologic stage from 10/25/2023: No Stage Recommended (ypT0, pN0, cM0) - Signed by Starla Calles DO on 10/25/2023        Diagnostic/Therapeutic History:    On 12/21/2022 she had diagnostic mammogram that was done that showed heterogeneously dense breast tissue with a focal symptom marker overlying the outer left breast  at anterior depth, adjacent to the marker is an irregular increased density mass with architectural distortion.  BI-RADS Category 5   Ultrasound: This corresponds with an area of the lump localized at the 3 o'clock position of the left breast 4 cm from the nipple there was a 2.2 x 2.2 x 1.1 cm lobulated irregularly irregular hypoechoic mass with multiple abnormal appearing left axillary  lymph nodes.      On 1/10/2023 she had left breast ultrasound-guided core needle biopsy showed invasive ductal carcinoma, grade 2-3, DCIS, high nuclear grade ER 95%, MA 70%, HER2 2+, nonamplified by dual ROMERO MammaPrint index: -0.154, high risk luminal B left axilla ultrasound-guided  core needle biopsy showed metastatic carcinoma       CT C/A/P showed focal breast tissue nodularity and prominent left axillary LN in addition to left Internal mammary LN with no evidence of distant disease; no evidence of bone mets on bone scan     8/11/2023: completed neoadjuvant chemotherapy with ddAC followed by weekly taxol    09/20/2023: Dr Rojas Performed a left PM and SLNB (0/3) that showed a complete pathological response     History of Present Illness (HPI)/Interval History:  Ms. Henriquez presents for presents today for  "follow-up, treatment and surveillance. She is on anastrozole.     She has new left arm swelling and pain from shoulder to elbow x 1 maliha months. 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   Denies any fever or chills.     She denies any chest pain or breathing issues.     She denies any vision changes or headache issues, dizziness, loss of balance. She did have hearing loss a few months back after falling in the shower from low blood sugar.     She denies any new or unexplained bone aches or pains.  Generalized bone pain due to anastrozole.     She denies any skin lesions or masses, hair loss, nail changes, or oral sores.    She report a \"crappy\" appetite due to pain makes her nauseated.     PMH:  No PMH , horseshoe kidney     PSH: tubal ligation      Allergies: NKDA     No meds     Reproductive hx: menarche 14, 9 years ago no menses since ligation, , 2nd one who got , OCP x couple years, No HRT     Family hx: mom at age 41 with breast cancer, grandma breast cancer and ovarian cancer  at 73, galindodma breast cancer  at 58, uncle for mom’s side colon cancer, grandfather on mother side has skin cancer     Social hx: non smoker, alcohol twice a week          Review of Systems:  14-point ROS otherwise negative, as per HPI.    Past Medical History:   Diagnosis Date   • Breast cancer (Multi)    • Personal history of irradiation        Past Surgical History:   Procedure Laterality Date   • BREAST LUMPECTOMY Left    • ENDOMETRIAL ABLATION     • TUBAL LIGATION         Social History     Socioeconomic History   • Marital status:    Tobacco Use   • Smoking status: Never     Passive exposure: Never   • Smokeless tobacco: Never   Vaping Use   • Vaping status: Never Used   Substance and Sexual Activity   • Alcohol use: Not Currently     Alcohol/week: 4.0 standard drinks of alcohol     Types: 4 Shots of liquor per week   • Drug use: Never   • Sexual activity: Not " Currently     Social Determinants of Health     Financial Resource Strain: Not on File (2022)    Received from [email or web address]    Financial Resource Strain    • Financial Resource Strain: 0   Food Insecurity: No Food Insecurity (2023)    Hunger Vital Sign    • Worried About Running Out of Food in the Last Year: Never true    • Ran Out of Food in the Last Year: Never true   Transportation Needs: Not on File (2022)    Received from [facility]    Transportation Needs    • Transportation: 0   Physical Activity: Not on File (2022)    Received from [facility]    Physical Activity    • Physical Activity: 0   Stress: Not on File (2022)    Received from [facility]    Stress    • Stress: 0   Social Connections: Not on File (2022)    Received from [facility]    Social Connections    • Social Connections and Isolation: 0   Housing Stability: Not on File (2022)    Received from [facility]    Housing Stability    • [place]ous[place]       No Known Allergies      Current Outpatient Medications:   •  cholecalciferol, vitamin D3, (VITAMIN D3 ORAL), Take 2,000 Int'l Units/day by mouth once daily. gummies, Disp: , Rfl:   •  dextromethorphan-guaifenesin (Mucinex DM)  mg 12 hr tablet, Take 1 tablet by mouth every 12 hours. Do not crush, chew, or split., Disp: , Rfl:   •  dicyclomine (Bentyl) 10 mg capsule, Take 1 capsule (10 mg) by mouth 4 times a day as needed., Disp: , Rfl:   •  exemestane (Aromasin) 25 mg tablet, Take 1 tablet (25 mg total) by mouth once daily.  Take after a meal.  Try to take at the same time each day., Disp: 30 tablet, Rfl: 1  •  gabapentin (Neurontin) 100 mg capsule, Take 1 capsule (100 mg) by mouth if needed., Disp: , Rfl:   •  methocarbamol (Robaxin) 500 mg tablet, Take 1 tablet (500 mg) by mouth 4 times a day as needed., Disp: , Rfl:   •  penicillin v potassium (Veetid) 500 mg tablet, Take 1 tablet (500 mg) by mouth 4 times a day., Disp: , Rfl:   •  sucralfate  (Carafate) 1 gram tablet, Take 1 tablet (1 g) by mouth 4 times a day before meals. Dissolve tablet in 3 teaspoons of water, Disp: 120 tablet, Rfl: 11  •  SUMAtriptan (Imitrex) 50 mg tablet, 1 tablet PO for severe headache, may repeat in 2 hrs(max 200 mg/24 hrs) Orally, Disp: , Rfl:   •  venlafaxine XR (Effexor XR) 37.5 mg 24 hr capsule, Take 1 capsule (37.5 mg) by mouth once daily. Do not crush or chew. (Patient not taking: Reported on 2023), Disp: 30 capsule, Rfl: 2     Objective    BSA: There is no height or weight on file to calculate BSA.  There were no vitals taken for this visit.    Performance Status:  The ECOG performance scale today is ECO- Restricted in physically strenuous activity.  Carries out light duty.     Physical Exam  Vitals reviewed.   Constitutional:       General: She is awake. She is not in acute distress.     Appearance: Normal appearance. She is not ill-appearing.   HENT:      Mouth/Throat:      Pharynx: Oropharynx is clear. No oropharyngeal exudate.   Eyes:      General: No scleral icterus.     Conjunctiva/sclera: Conjunctivae normal.   Neck:      Trachea: Trachea and phonation normal. No tracheal tenderness.   Cardiovascular:      Rate and Rhythm: Normal rate and regular rhythm.      Heart sounds: No murmur heard.     No friction rub. No gallop.   Pulmonary:      Effort: Pulmonary effort is normal. No respiratory distress.      Breath sounds: Normal breath sounds. No stridor. No wheezing, rhonchi or rales.   Chest:      Chest wall: No mass, lacerations, deformity or tenderness.   Breasts:     Right: No swelling, mass, nipple discharge, skin change or tenderness.      Left: Swelling and tenderness present. No mass, nipple discharge or skin change.      Comments: Diffuse tenderness of the left breast along with left axilla and upper arm   Abdominal:      General: Abdomen is flat. There is no distension.      Palpations: Abdomen is soft. There is no mass.      Tenderness: There is  no abdominal tenderness.   Lymphadenopathy:      Cervical: No cervical adenopathy.      Upper Body:      Right upper body: No supraclavicular, axillary or pectoral adenopathy.      Left upper body: No supraclavicular, axillary or pectoral adenopathy.   Skin:     General: Skin is warm and dry.      Coloration: Skin is not jaundiced.      Findings: No lesion or rash.   Neurological:      General: No focal deficit present.      Mental Status: She is alert and oriented to person, place, and time.      Motor: No weakness.      Gait: Gait normal.   Psychiatric:         Mood and Affect: Mood normal.         Thought Content: Thought content normal.         Judgment: Judgment normal.       Laboratory Data:  Lab Results   Component Value Date    WBC 4.2 (L) 07/25/2024    HGB 14.5 07/25/2024    HCT 43.1 07/25/2024    MCV 89 07/25/2024     07/25/2024    ANC 6.42 04/28/2023       Chemistry    Lab Results   Component Value Date/Time     07/25/2024 0925    K 4.2 07/25/2024 0925     07/25/2024 0925    CO2 29 07/25/2024 0925    BUN 16 07/25/2024 0925    CREATININE 0.84 07/25/2024 0925    Lab Results   Component Value Date/Time    CALCIUM 10.2 07/25/2024 0925    ALKPHOS 150 (H) 07/25/2024 0925    AST 17 07/25/2024 0925    ALT 16 07/25/2024 0925    BILITOT 0.8 07/25/2024 0925             Radiology:  NM PET CT whole body  Narrative: Interpreted By:  Sulaiman Klein,  and Janie Andrews   STUDY:  NM PET CT WHOLE BODY;  7/26/2024 8:57 am      INDICATION:  Signs/Symptoms:left arm/shoulder breast pain with hx of locally  advanced breast cancer. generalized bone pain. 48-year-old female  diagnosed with left breast cancer 01/10/2023 status post neoadjuvant  chemotherapy and partial mastectomy with sentinel lymph node biopsy  on 09/20/2023. Currently on anastrozole.      COMPARISON:  Whole-body bone scintigraphy 02/28/2023  CT chest abdomen pelvis 02/20/2023      ACCESSION NUMBER(S):  [id number]      ORDERING  CLINICIAN:  JESSE BOB      TECHNIQUE:  DIVISION OF NUCLEAR MEDICINE  POSITRON EMISSION TOMOGRAPHY (PET-CT)      The patient received an intravenous dose of 14.6 mCi of Fluorine-18  fluorodeoxyglucose (FDG).   Positron emission tomographic (PET)  images from skull base to mid-thigh were then acquired after a one  hour delay.  Also acquired was a contemporaneous low dose noncontrast  CT scan performed for attenuation correction of PET images and  anatomic localization.  The PET and CT images were digitally fused  for display.  All images were acquired on a combined PET-CT scanner  unit.  Some areas of FDG accumulation may be described in  standardized uptake value (SUV) units.      CODING:      Initial Treatment Strategy (PI)          CALIBRATION:      Dose Injection-to-Scan Interval (mins): 58  Mediastinal bloodpool SUV (normal 1.5-2.5): 2.1  Blood glucose: 108 mg/dL      FINDINGS:  HEAD AND NECK:  No evidence of focal hypermetabolic lesion in the visualized brain  parenchyma, noting that evaluation is limited because of the expected  physiologic diffuse FDG uptake in the brain. No focal hypermetabolic  soft tissue lesion is seen in the neck. No hypermetabolic cervical  lymphadenopathy is present. Note is made of postoperative changes  from prior . No evidence of abnormal FDG uptake is seen in the  surgical bed to suggest disease recurrence.      CHEST:  No focal hypermetabolic lesion is seen in the lung parenchyma.  No evidence of hypermetabolic mediastinal, hilar or axillary  lymphadenopathy. Note is made of postoperative changes from prior  left partial mastectomy with some mildly increased FDG avidity in the  surgical site (max SUV 1.5) likely reflecting postsurgical changes.  No evidence of abnormal FDG uptake is seen in the surgical bed to  suggest disease recurrence.      ABDOMEN AND PELVIS:  No hypermetabolic soft tissue lesion is present in the abdomen and  pelvis. No evidence of hypermetabolic  lymphadenopathy.  Physiologic radiotracer uptake is present in the liver and spleen  with excretion into the bowel loops and the genitourinary tract.          MUSCULOSKELETAL/EXTREMITIES:  No focal hypermetabolic lesion is seen in the axial or appendicular  to suggest osseous metastasis.      Impression: 1. Postsurgical changes of the left partial mastectomy with mildly  increased FDG avidity in the surgical site likely reflecting  postsurgical changes.  2. No focal hypermetabolic activity throughout the body to suggest  metastatic disease.      I personally reviewed the images/study and I agree with the findings  as stated by Darryl Lima MD (resident) . This study was  interpreted at [place], [place].      MACRO:  None      Signed by: Sulaiman Klein 7/26/2024 12:30 PM  Dictation workstation:   [id number]       No results found for this or any previous visit from the past 365 days.          Assessment/Plan:      47 year old female overall medically healthy with recent diagnosis of left sided cT2N1 IDC ER 95%, MT 70%, HER2 2+, nonamplified by dual ROMERO MammaPrint index: -0.154, high risk luminal  B left axilla ultrasound-guided core needle biopsy showed metastatic carcinoma, discussed in TB with plan for neoadjuvant chemotherapy      On 12/21/2022 she had diagnostic mammogram that was done that showed heterogeneously dense breast tissue with a focal symptom marker overlying the outer left breast at anterior depth, adjacent to the marker is an irregular increased density mass with  architectural distortion.  BI-RADS Category 5   Ultrasound: This corresponds with an area of the lump localized at the 3 o'clock position of the left breast 4 cm from the nipple there was a 2.2 x 2.2 x 1.1 cm lobulated irregularly  irregular hypoechoic mass with multiple abnormal appearing left axillary lymph nodes.      On 1/10/2023 she had left breast ultrasound-guided core needle  biopsy showed invasive ductal carcinoma, grade 2-3, DCIS, high nuclear grade ER 95%, ND 70%, HER2 2+, nonamplified by dual ROMERO MammaPrint index: -0.154, high risk luminal B left axilla ultrasound-guided  core needle biopsy showed metastatic carcinoma     I reviewed with her the events that led to her diagnosis of breast cancer. We reviewed all the procedures and diagnostic imaging she underwent thus far. I discussed the features of her breast cancer that include the size, grade, lymph node status and  hormone receptor/ her2-xin status.     CT C/A/P showed focal breast tissue nodularity and prominent left axillary LN in addition to left Internal mammary LN with no evidence of distant disease; no evidence of bone mets on bone scan     8/11/2023: completed neoadjuvant chemotherapy with ddAC followed by weekly taxol    09/20/2023: Dr Rojas Performed a left PM and SLNB (0/3) that showed a complete pathological response     - s/p Whole breast and regional adriana radiation to a dose of 42.56 Gy in 16 fractions with a simultaneous integrated boost to the positive intramammary node and lumpectomy bed    Started on anastrozole and Did have hot flushes, quite severe as well as bony pain. Pauses anastrozole and restarted and symptoms did recur. Recent lab panel: estradiol<19, FSH: 131  Generalized arthralgias: will do a 2 week pause on anastrozole then discussed switch to exemestane   Left breast/axilla/upper extremity swelling: STAT PET scan ordered for further work up   Grade 2 fatigue: PET scan as above. Labs - Alk phos 150 and elevated ferritin.   - next 6 month left breast follow up in Dec 2024     Fibroids  Pelvic/Uterine/Ovarian US: Fibroid in the right lower uterine body and Probable hemorrhagic corpus luteum left ovary.   - Follow-up with GYN      Thyroid nodules  Thyroid US: Right thyroid lobe 1.5 cm and left thyroid lobe 0.6 cm TIRADS 2 mixed solid and cystic nodules which are likely benign   - Likely benign no  follow-up imaging recommended   - Defer to PCP for continued monitoring     RTC in 4 months with Judy Cosme PA-C if not needed sooner     Judy Cosme PA-C  Hematology and Medical Oncology  [facility]

## 2024-09-13 ENCOUNTER — APPOINTMENT (OUTPATIENT)
Dept: HEMATOLOGY/ONCOLOGY | Facility: HOSPITAL | Age: 49
End: 2024-09-13
Payer: MEDICAID

## 2024-10-10 NOTE — PROGRESS NOTES
Patient ID: Sapna Henriquez is a 48 y.o. female.    The patient presents to clinic today for her history of breast cancer.     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, estrogen receptor positive (Multi)  Staging form: Breast, AJCC 8th Edition  - Clinical stage from 10/25/2023: Stage IIIB (cT2, cN3b, cM0, G3, ER+, MT+, HER2: Equivocal) - Signed by Starla Calles DO on 10/25/2023  - Pathologic stage from 10/25/2023: No Stage Recommended (ypT0, pN0, cM0) - Signed by Starla Calles DO on 10/25/2023        Diagnostic/Therapeutic History:    On 12/21/2022 she had diagnostic mammogram that was done that showed heterogeneously dense breast tissue with a focal symptom marker overlying the outer left breast  at anterior depth, adjacent to the marker is an irregular increased density mass with architectural distortion.  BI-RADS Category 5   Ultrasound: This corresponds with an area of the lump localized at the 3 o'clock position of the left breast 4 cm from the nipple there was a 2.2 x 2.2 x 1.1 cm lobulated irregularly irregular hypoechoic mass with multiple abnormal appearing left axillary  lymph nodes.      On 1/10/2023 she had left breast ultrasound-guided core needle biopsy showed invasive ductal carcinoma, grade 2-3, DCIS, high nuclear grade ER 95%, MT 70%, HER2 2+, nonamplified by dual ROMERO MammaPrint index: -0.154, high risk luminal B left axilla ultrasound-guided  core needle biopsy showed metastatic carcinoma       CT C/A/P showed focal breast tissue nodularity and prominent left axillary LN in addition to left Internal mammary LN with no evidence of distant disease; no evidence of bone mets on bone scan     8/11/2023: completed neoadjuvant chemotherapy with ddAC followed by weekly taxol    09/20/2023: Dr Rojas Performed a left PM and SLNB (0/3) that showed a complete pathological response     Jan 2024 - Started anastrozole    July 2024 - switched to exemestane     History of Present Illness  (HPI)/Interval History:  Ms. Henriquez presents for presents today for follow-up, treatment and surveillance. She is on exemestane.     She denies any breast cancer concerns. Her lymphedema in her left arm is okay.     She denies any chest pain or breathing issues.     She denies any vision changes or headache issues, dizziness, loss of balance.     She denies any new or unexplained bone aches or pains.  Generalized bone pain is gone with switch to exemestane. Recently broke her tooth - needs to be evaluation.     She denies any skin lesions or masses, hair loss, nail changes, or oral sores.    Her appetite is better.       PMH:  No PMH , horseshoe kidney     PSH: tubal ligation      Allergies: NKDA     No meds     Reproductive hx: menarche 14, 9 years ago no menses since ligation, , 2nd one who got , OCP x couple years, No HRT     Family hx: mom at age 41 with breast cancer, grandma breast cancer and ovarian cancer  at 73, greaatgrandma breast cancer  at 58, uncle for mom’s side colon cancer, grandfather on mother side has skin cancer     Social hx: non smoker, alcohol twice a week       Review of Systems:  14-point ROS otherwise negative, as per HPI.    Past Medical History:   Diagnosis Date    Breast cancer (Multi)     Personal history of irradiation        Past Surgical History:   Procedure Laterality Date    BREAST LUMPECTOMY Left     ENDOMETRIAL ABLATION      TUBAL LIGATION         Social History     Socioeconomic History    Marital status:    Tobacco Use    Smoking status: Never     Passive exposure: Never    Smokeless tobacco: Never   Vaping Use    Vaping status: Never Used   Substance and Sexual Activity    Alcohol use: Not Currently     Alcohol/week: 4.0 standard drinks of alcohol     Types: 4 Shots of liquor per week    Drug use: Never    Sexual activity: Not Currently     Social Determinants of Health     Financial Resource Strain: Not on File (2022)    Received from [facility]    Financial Resource Strain     Financial Resource Strain: 0   Transportation Needs: Not on File (2022)    Received from [facility]    Transportation Needs     Transportation: 0   Physical Activity: Not on File (2022)    Received from [employer]    Physical Activity     Physical Activity: 0   Stress: Not on File (2022)    Received from [facility]    Stress     Stress: 0   Social Connections: Not on File (2024)    Received from [employer]    Social Connections     Connectedness: 0   Housing Stability: Not on File (2022)    Received from [employer]    Housing Stability     Housin       No Known Allergies      Current Outpatient Medications:     cholecalciferol, vitamin D3, (VITAMIN D3 ORAL), Take 2,000 Int'l Units/day by mouth once daily. gummies, Disp: , Rfl:     dextromethorphan-guaifenesin (Mucinex DM)  mg 12 hr tablet, Take 1 tablet by mouth every 12 hours. Do not crush, chew, or split., Disp: , Rfl:     dicyclomine (Bentyl) 10 mg capsule, Take 1 capsule (10 mg) by mouth 4 times a day as needed., Disp: , Rfl:     exemestane (Aromasin) 25 mg tablet, Take 1 tablet (25 mg total) by mouth once daily.  Take after a meal.  Try to take at the same time each day., Disp: 30 tablet, Rfl: 1    gabapentin (Neurontin) 100 mg capsule, Take 1 capsule (100 mg) by mouth if needed., Disp: , Rfl:     methocarbamol (Robaxin) 500 mg tablet, Take 1 tablet (500 mg) by mouth 4 times a day as needed., Disp: , Rfl:     penicillin v potassium (Veetid) 500 mg tablet, Take 1 tablet (500 mg) by mouth 4 times a day., Disp: , Rfl:     sucralfate (Carafate) 1 gram tablet, Take 1 tablet (1 g) by mouth 4 times a day before meals. Dissolve tablet in 3 teaspoons of water, Disp: 120 tablet, Rfl: 11    SUMAtriptan (Imitrex) 50 mg tablet, 1 tablet PO for severe headache, may repeat in 2 hrs(max 200 mg/24 hrs) Orally, Disp: , Rfl:     venlafaxine XR (Effexor XR) 37.5 mg 24 hr capsule, Take 1 capsule (37.5  mg) by mouth once daily. Do not crush or chew. (Patient not taking: Reported on 2023), Disp: 30 capsule, Rfl: 2     Objective    BSA: There is no height or weight on file to calculate BSA.  There were no vitals taken for this visit.    Performance Status:  The ECOG performance scale today is ECO- Restricted in physically strenuous activity.  Carries out light duty.     Physical Exam  Vitals reviewed.   Constitutional:       General: She is awake. She is not in acute distress.     Appearance: Normal appearance. She is not ill-appearing.   HENT:      Mouth/Throat:      Pharynx: Oropharynx is clear. No oropharyngeal exudate.   Eyes:      General: No scleral icterus.     Conjunctiva/sclera: Conjunctivae normal.   Neck:      Trachea: Trachea and phonation normal. No tracheal tenderness.   Cardiovascular:      Rate and Rhythm: Normal rate and regular rhythm.      Heart sounds: No murmur heard.     No friction rub. No gallop.   Pulmonary:      Effort: Pulmonary effort is normal. No respiratory distress.      Breath sounds: Normal breath sounds. No stridor. No wheezing, rhonchi or rales.   Chest:      Chest wall: No mass, lacerations, deformity or tenderness.   Breasts:     Right: No swelling, mass, nipple discharge, skin change or tenderness.      Left: No swelling, mass, nipple discharge, skin change or tenderness.      Comments: S/p left lumpectomy   Abdominal:      General: Abdomen is flat. There is no distension.      Palpations: Abdomen is soft. There is no mass.      Tenderness: There is no abdominal tenderness.   Lymphadenopathy:      Cervical: No cervical adenopathy.      Upper Body:      Right upper body: No supraclavicular, axillary or pectoral adenopathy.      Left upper body: No supraclavicular, axillary or pectoral adenopathy.   Skin:     General: Skin is warm and dry.      Coloration: Skin is not jaundiced.      Findings: No lesion or rash.   Neurological:      General: No focal deficit present.       Mental Status: She is alert and oriented to person, place, and time.      Motor: No weakness.      Gait: Gait normal.   Psychiatric:         Mood and Affect: Mood normal.         Thought Content: Thought content normal.         Judgment: Judgment normal.         Laboratory Data:  Lab Results   Component Value Date    WBC 4.2 (L) 07/25/2024    HGB 14.5 07/25/2024    HCT 43.1 07/25/2024    MCV 89 07/25/2024     07/25/2024    ANC 6.42 04/28/2023       Chemistry    Lab Results   Component Value Date/Time     07/25/2024 0925    K 4.2 07/25/2024 0925     07/25/2024 0925    CO2 29 07/25/2024 0925    BUN 16 07/25/2024 0925    CREATININE 0.84 07/25/2024 0925    Lab Results   Component Value Date/Time    CALCIUM 10.2 07/25/2024 0925    ALKPHOS 150 (H) 07/25/2024 0925    AST 17 07/25/2024 0925    ALT 16 07/25/2024 0925    BILITOT 0.8 07/25/2024 0925             Radiology:  NM PET CT whole body  Narrative: Interpreted By:  Sulaiman Klein  and Janie Andrews   STUDY:  NM PET CT WHOLE BODY;  7/26/2024 8:57 am      INDICATION:  Signs/Symptoms:left arm/shoulder breast pain with hx of locally  advanced breast cancer. generalized bone pain. 48-year-old female  diagnosed with left breast cancer 01/10/2023 status post neoadjuvant  chemotherapy and partial mastectomy with sentinel lymph node biopsy  on 09/20/2023. Currently on anastrozole.      COMPARISON:  Whole-body bone scintigraphy 02/28/2023  CT chest abdomen pelvis 02/20/2023      ACCESSION NUMBER(S):  [id number]      ORDERING CLINICIAN:  JESSE BOB      TECHNIQUE:  DIVISION OF NUCLEAR MEDICINE  POSITRON EMISSION TOMOGRAPHY (PET-CT)      The patient received an intravenous dose of 14.6 mCi of Fluorine-18  fluorodeoxyglucose (FDG).   Positron emission tomographic (PET)  images from skull base to mid-thigh were then acquired after a one  hour delay.  Also acquired was a contemporaneous low dose noncontrast  CT scan performed for attenuation  correction of PET images and  anatomic localization.  The PET and CT images were digitally fused  for display.  All images were acquired on a combined PET-CT scanner  unit.  Some areas of FDG accumulation may be described in  standardized uptake value (SUV) units.      CODING:      Initial Treatment Strategy (PI)          CALIBRATION:      Dose Injection-to-Scan Interval (mins): 58  Mediastinal bloodpool SUV (normal 1.5-2.5): 2.1  Blood glucose: 108 mg/dL      FINDINGS:  HEAD AND NECK:  No evidence of focal hypermetabolic lesion in the visualized brain  parenchyma, noting that evaluation is limited because of the expected  physiologic diffuse FDG uptake in the brain. No focal hypermetabolic  soft tissue lesion is seen in the neck. No hypermetabolic cervical  lymphadenopathy is present. Note is made of postoperative changes  from prior . No evidence of abnormal FDG uptake is seen in the  surgical bed to suggest disease recurrence.      CHEST:  No focal hypermetabolic lesion is seen in the lung parenchyma.  No evidence of hypermetabolic mediastinal, hilar or axillary  lymphadenopathy. Note is made of postoperative changes from prior  left partial mastectomy with some mildly increased FDG avidity in the  surgical site (max SUV 1.5) likely reflecting postsurgical changes.  No evidence of abnormal FDG uptake is seen in the surgical bed to  suggest disease recurrence.      ABDOMEN AND PELVIS:  No hypermetabolic soft tissue lesion is present in the abdomen and  pelvis. No evidence of hypermetabolic lymphadenopathy.  Physiologic radiotracer uptake is present in the liver and spleen  with excretion into the bowel loops and the genitourinary tract.          MUSCULOSKELETAL/EXTREMITIES:  No focal hypermetabolic lesion is seen in the axial or appendicular  to suggest osseous metastasis.      Impression: 1. Postsurgical changes of the left partial mastectomy with mildly  increased FDG avidity in the surgical site likely  reflecting  postsurgical changes.  2. No focal hypermetabolic activity throughout the body to suggest  metastatic disease.      I personally reviewed the images/study and I agree with the findings  as stated by Darryl Lima MD (resident) . This study was  interpreted at [facility],  [place], [place].      MACRO:  None      Signed by: Sulaiman Latoya 7/26/2024 12:30 PM  Dictation workstation:   [id number]       No results found for this or any previous visit from the past 365 days.          Assessment/Plan:      47 year old female overall medically healthy with recent diagnosis of left sided cT2N1 IDC ER 95%, GA 70%, HER2 2+, nonamplified by dual ROMERO MammaPrint index: -0.154, high risk luminal  B left axilla ultrasound-guided core needle biopsy showed metastatic carcinoma, discussed in TB with plan for neoadjuvant chemotherapy      On 12/21/2022 she had diagnostic mammogram that was done that showed heterogeneously dense breast tissue with a focal symptom marker overlying the outer left breast at anterior depth, adjacent to the marker is an irregular increased density mass with  architectural distortion.  BI-RADS Category 5   Ultrasound: This corresponds with an area of the lump localized at the 3 o'clock position of the left breast 4 cm from the nipple there was a 2.2 x 2.2 x 1.1 cm lobulated irregularly  irregular hypoechoic mass with multiple abnormal appearing left axillary lymph nodes.      On 1/10/2023 she had left breast ultrasound-guided core needle biopsy showed invasive ductal carcinoma, grade 2-3, DCIS, high nuclear grade ER 95%, GA 70%, HER2 2+, nonamplified by dual ROMERO MammaPrint index: -0.154, high risk luminal B left axilla ultrasound-guided  core needle biopsy showed metastatic carcinoma     I reviewed with her the events that led to her diagnosis of breast cancer. We reviewed all the procedures and diagnostic imaging she underwent thus far. I discussed the  features of her breast cancer that include the size, grade, lymph node status and  hormone receptor/ her2-xin status.     CT C/A/P showed focal breast tissue nodularity and prominent left axillary LN in addition to left Internal mammary LN with no evidence of distant disease; no evidence of bone mets on bone scan     8/11/2023: completed neoadjuvant chemotherapy with ddAC followed by weekly taxol    09/20/2023: Dr Rojas Performed a left PM and SLNB (0/3) that showed a complete pathological response     - s/p Whole breast and regional adriana radiation to a dose of 42.56 Gy in 16 fractions with a simultaneous integrated boost to the positive intramammary node and lumpectomy bed    - Switch to exemestane is much better tolerated, will continue  - Grade 1-2 hot flashes: prefers to monitor for now vs pharmaceutical intervention   - Mammogram and follow up with Dr. Rojas in Dec 2024     There is no evidence of breast cancer recurrence based on her clinical exam today.       Fibroids  Pelvic/Uterine/Ovarian US: Fibroid in the right lower uterine body and Probable hemorrhagic corpus luteum left ovary.   - Follow-up with GYN      Thyroid nodules  Thyroid US: Right thyroid lobe 1.5 cm and left thyroid lobe 0.6 cm TIRADS 2 mixed solid and cystic nodules which are likely benign   - Likely benign no follow-up imaging recommended   - Defer to PCP for continued monitoring     RTC in 4 months with DALILA Carbajal PA-C  Hematology and Medical Oncology  [facility]

## 2024-10-11 ENCOUNTER — OFFICE VISIT (OUTPATIENT)
Dept: HEMATOLOGY/ONCOLOGY | Facility: HOSPITAL | Age: 49
End: 2024-10-11
Payer: MEDICAID

## 2024-10-11 VITALS
SYSTOLIC BLOOD PRESSURE: 110 MMHG | RESPIRATION RATE: 18 BRPM | BODY MASS INDEX: 23.51 KG/M2 | DIASTOLIC BLOOD PRESSURE: 77 MMHG | OXYGEN SATURATION: 99 % | WEIGHT: 116.62 LBS | TEMPERATURE: 97.3 F | HEIGHT: 59 IN | HEART RATE: 60 BPM

## 2024-10-11 DIAGNOSIS — K21.9 GASTROESOPHAGEAL REFLUX DISEASE WITHOUT ESOPHAGITIS: Primary | ICD-10-CM

## 2024-10-11 DIAGNOSIS — C50.412 MALIGNANT NEOPLASM OF UPPER-OUTER QUADRANT OF LEFT BREAST IN FEMALE, ESTROGEN RECEPTOR POSITIVE: ICD-10-CM

## 2024-10-11 DIAGNOSIS — T45.1X5A HOT FLASHES RELATED TO AROMATASE INHIBITOR THERAPY: ICD-10-CM

## 2024-10-11 DIAGNOSIS — R23.2 HOT FLASHES RELATED TO AROMATASE INHIBITOR THERAPY: ICD-10-CM

## 2024-10-11 DIAGNOSIS — Z17.0 MALIGNANT NEOPLASM OF UPPER-OUTER QUADRANT OF LEFT BREAST IN FEMALE, ESTROGEN RECEPTOR POSITIVE: ICD-10-CM

## 2024-10-11 PROCEDURE — 99214 OFFICE O/P EST MOD 30 MIN: CPT

## 2024-10-11 PROCEDURE — 3008F BODY MASS INDEX DOCD: CPT

## 2024-10-11 PROCEDURE — 1036F TOBACCO NON-USER: CPT

## 2024-10-11 RX ORDER — OMEPRAZOLE 20 MG/1
20 CAPSULE, DELAYED RELEASE ORAL
Qty: 30 CAPSULE | Refills: 0 | Status: SHIPPED | OUTPATIENT
Start: 2024-10-11 | End: 2025-10-11

## 2024-10-11 RX ORDER — GABAPENTIN 100 MG/1
300 CAPSULE ORAL NIGHTLY
Qty: 90 CAPSULE | Refills: 1 | Status: SHIPPED | OUTPATIENT
Start: 2024-10-11

## 2024-10-11 RX ORDER — EXEMESTANE 25 MG/1
25 TABLET ORAL DAILY
Qty: 90 TABLET | Refills: 1 | Status: SHIPPED | OUTPATIENT
Start: 2024-10-11 | End: 2025-10-11

## 2024-10-11 ASSESSMENT — PAIN SCALES - GENERAL: PAINLEVEL: 10-WORST PAIN EVER

## 2024-10-26 ENCOUNTER — OFFICE VISIT (OUTPATIENT)
Dept: URGENT CARE | Facility: URGENT CARE | Age: 49
End: 2024-10-26
Payer: MEDICAID

## 2024-10-26 VITALS
TEMPERATURE: 97.7 F | RESPIRATION RATE: 18 BRPM | DIASTOLIC BLOOD PRESSURE: 85 MMHG | OXYGEN SATURATION: 99 % | SYSTOLIC BLOOD PRESSURE: 124 MMHG | WEIGHT: 113.1 LBS | HEART RATE: 66 BPM | BODY MASS INDEX: 22.83 KG/M2

## 2024-10-26 DIAGNOSIS — H91.92 DECREASED HEARING OF LEFT EAR: ICD-10-CM

## 2024-10-26 DIAGNOSIS — H69.92 EUSTACHIAN TUBE DYSFUNCTION, LEFT: Primary | ICD-10-CM

## 2024-10-26 RX ORDER — METHYLPREDNISOLONE 4 MG/1
TABLET ORAL
Qty: 21 TABLET | Refills: 0 | Status: SHIPPED | OUTPATIENT
Start: 2024-10-26 | End: 2024-11-02

## 2024-10-26 ASSESSMENT — ENCOUNTER SYMPTOMS
NUMBNESS: 0
CHILLS: 0
ABDOMINAL PAIN: 0
NECK PAIN: 0
SHORTNESS OF BREATH: 0
BACK PAIN: 0
FLANK PAIN: 0
VOMITING: 0
LIGHT-HEADEDNESS: 0
WEAKNESS: 0
SORE THROAT: 0
NECK STIFFNESS: 0
NAUSEA: 0
FEVER: 0
DIARRHEA: 0
CONSTIPATION: 0
DYSURIA: 0

## 2024-10-26 ASSESSMENT — PAIN SCALES - GENERAL: PAINLEVEL_OUTOF10: 0-NO PAIN

## 2024-10-26 NOTE — PROGRESS NOTES
Subjective   Patient ID: Sapna Henriquez is a 48 y.o. female. They present today with a chief complaint of Other (Pt. C/O left ear [place], off balance-started 1 day ago./On amox for tooth infection, started Tuesday. ).    History of Present Illness  48 year old female presents with complaint of left sided otalgia and decreased hearing.  Patient states she had a dental infection, began amoxil on Tuesday.  Patient states she has scheduled surgery for tooth on Wednesday.  She is concerned because she has developed left ear pain and decreased hearing.  She at times feels off balance.  She is not dizzy.  No headache, focal weakness or numbness, visual changes.  She states she had these exact same symptoms once previously for which she followed with ENT.  She states that steroids resolved symptoms.  Review of ENT notes tinnitus with suspected eustachian tube dysfunction treated with Medrol Dosepak.  No fever or chills.  She has history of breast cancer for which she has had radiation.  She is currently in remission and had a negative PET scan a couple of months ago.      History provided by:  Patient   used: No        Past Medical History  Allergies as of 10/26/2024    (No Known Allergies)       (Not in a hospital admission)       Past Medical History:   Diagnosis Date    Breast cancer (Multi)     Personal history of irradiation        Past Surgical History:   Procedure Laterality Date    BREAST LUMPECTOMY Left     ENDOMETRIAL ABLATION      TUBAL LIGATION          reports that she has never smoked. She has never been exposed to tobacco smoke. She has never used smokeless tobacco. She reports that she does not currently use alcohol after a past usage of about 4.0 standard drinks of alcohol per week. She reports that she does not use drugs.    Review of Systems  Review of Systems   Constitutional:  Negative for chills and fever.   HENT:  Positive for dental problem, ear pain and hearing loss. Negative for  congestion, ear discharge and sore throat.    Respiratory:  Negative for shortness of breath.    Cardiovascular:  Negative for chest pain.   Gastrointestinal:  Negative for abdominal pain, constipation, diarrhea, nausea and vomiting.   Genitourinary:  Negative for dysuria, flank pain and urgency.   Musculoskeletal:  Negative for back pain, neck pain and neck stiffness.   Neurological:  Negative for syncope, weakness, light-headedness and numbness.   All other systems reviewed and are negative.                                 Objective    Vitals:    10/26/24 1121   BP: 124/85   BP Location: Right arm   Patient Position: Sitting   BP Cuff Size: Adult   Pulse: 66   Resp: 18   Temp: 36.5 °C (97.7 °F)   TempSrc: Oral   SpO2: 99%   Weight: 51.3 kg (113 lb 1.5 oz)     No LMP recorded. (Menstrual status: Menopausal).    Physical Exam  Vitals and nursing note reviewed.   Constitutional:       General: She is not in acute distress.     Appearance: Normal appearance. She is normal weight. She is not ill-appearing, toxic-appearing or diaphoretic.   HENT:      Head: Normocephalic and atraumatic.      Comments: Left maxillary molar broken with dental decay and TTP.  No surrounding fluctuance or crepitus.  Face is nontender without erythema or swelling.  Floor the mouth is soft and nontender.  No trismus or drooling.     Right Ear: Tympanic membrane, ear canal and external ear normal. There is no impacted cerumen.      Left Ear: Tympanic membrane, ear canal and external ear normal. There is no impacted cerumen.      Nose: Nose normal. No congestion.      Mouth/Throat:      Mouth: Mucous membranes are moist.      Pharynx: No oropharyngeal exudate or posterior oropharyngeal erythema.   Eyes:      General: No scleral icterus.        Right eye: No discharge.         Left eye: No discharge.      Extraocular Movements: Extraocular movements intact.      Conjunctiva/sclera: Conjunctivae normal.      Pupils: Pupils are equal, round, and  reactive to light.   Cardiovascular:      Rate and Rhythm: Normal rate and regular rhythm.   Pulmonary:      Effort: Pulmonary effort is normal.   Abdominal:      General: Abdomen is flat.   Musculoskeletal:         General: Normal range of motion.      Cervical back: Normal range of motion and neck supple. No rigidity or tenderness.   Lymphadenopathy:      Cervical: No cervical adenopathy.   Skin:     General: Skin is warm and dry.      Capillary Refill: Capillary refill takes less than 2 seconds.      Coloration: Skin is not jaundiced or pale.      Findings: No rash.   Neurological:      General: No focal deficit present.      Mental Status: She is alert and oriented to person, place, and time.      Cranial Nerves: No cranial nerve deficit.      Sensory: No sensory deficit.      Motor: No weakness.      Coordination: Coordination normal.      Gait: Gait normal.      Comments: A&Ox4.   Cranial nerves II through XII grossly intact.   5/5 strength in upper and lower extremities intact.  Sensation intact and equal throughout.   No ataxia.  PERRLA.  EOMI.  Visual fields intact.  Normal speech.  Normal stable gait with ambulation.  Normal mentation, insight and judgement.     Psychiatric:         Mood and Affect: Mood normal.         Behavior: Behavior normal.         Procedures    Point of Care Test & Imaging Results from this visit  No results found for this visit on 10/26/24.   No results found.    Diagnostic study results (if any) were reviewed by Shani Jennings PA-C.    Assessment/Plan   Allergies, medications, history, and pertinent labs/EKGs/Imaging reviewed by Shani Jennings PA-C.     Medical Decision Making  48-year-old female presents with complaint of decreased hearing, left ear pain.  Chart review with history of suspected eustachian tube dysfunction.  Patient states symptoms are the exact same as previous.  I did consider urgent and emergent etiologies of symptoms such as ICH, posterior CVA,  intracranial tumor etc. however have very low suspicion for this given reassuring neurologic exam and previous similar symptoms associated with eustachian tube dysfunction for which she has seen ENT.  No red flag signs or symptoms for worsening dental infection.  Provided prescription for Medrol Dosepak as this was previous treatment and patient states that it resolved symptoms.  Encourage patient to follow-up with her primary care provider, return to clinic for persistent new or worsening symptoms.  Discussed indications for presentation to emergency department.  Advised patient not to drive as she at times feels off balance.     Orders and Diagnoses  Diagnoses and all orders for this visit:  Eustachian tube dysfunction, left  -     methylPREDNISolone (Medrol Dospak) 4 mg tablets; Follow schedule on package instructions  Decreased hearing of left ear      Medical Admin Record      Patient disposition: Home    Electronically signed by Shani Jennings PA-C  6:17 PM

## 2024-11-15 ENCOUNTER — APPOINTMENT (OUTPATIENT)
Dept: HEMATOLOGY/ONCOLOGY | Facility: HOSPITAL | Age: 49
End: 2024-11-15
Payer: MEDICAID

## 2024-12-03 ENCOUNTER — APPOINTMENT (OUTPATIENT)
Dept: RADIOLOGY | Facility: HOSPITAL | Age: 49
End: 2024-12-03
Payer: MEDICAID

## 2024-12-03 ENCOUNTER — HOSPITAL ENCOUNTER (OUTPATIENT)
Dept: RADIOLOGY | Facility: HOSPITAL | Age: 49
Discharge: HOME | End: 2024-12-03
Payer: MEDICAID

## 2024-12-03 VITALS — HEIGHT: 59 IN | WEIGHT: 110 LBS | BODY MASS INDEX: 22.18 KG/M2

## 2024-12-03 DIAGNOSIS — Z17.0 MALIGNANT NEOPLASM OF UPPER-OUTER QUADRANT OF LEFT BREAST IN FEMALE, ESTROGEN RECEPTOR POSITIVE: ICD-10-CM

## 2024-12-03 DIAGNOSIS — C50.412 MALIGNANT NEOPLASM OF UPPER-OUTER QUADRANT OF LEFT BREAST IN FEMALE, ESTROGEN RECEPTOR POSITIVE: ICD-10-CM

## 2024-12-03 PROCEDURE — 77065 DX MAMMO INCL CAD UNI: CPT | Mod: LEFT SIDE | Performed by: RADIOLOGY

## 2024-12-03 PROCEDURE — 77061 BREAST TOMOSYNTHESIS UNI: CPT | Mod: LT

## 2024-12-03 PROCEDURE — 77061 BREAST TOMOSYNTHESIS UNI: CPT | Mod: LEFT SIDE | Performed by: RADIOLOGY

## 2024-12-14 DIAGNOSIS — Z17.0 MALIGNANT NEOPLASM OF UPPER-OUTER QUADRANT OF LEFT BREAST IN FEMALE, ESTROGEN RECEPTOR POSITIVE: ICD-10-CM

## 2024-12-14 DIAGNOSIS — C50.412 MALIGNANT NEOPLASM OF UPPER-OUTER QUADRANT OF LEFT BREAST IN FEMALE, ESTROGEN RECEPTOR POSITIVE: ICD-10-CM

## 2024-12-14 RX ORDER — VENLAFAXINE HYDROCHLORIDE 37.5 MG/1
37.5 CAPSULE, EXTENDED RELEASE ORAL DAILY
Qty: 90 CAPSULE | Refills: 3 | Status: SHIPPED | OUTPATIENT
Start: 2024-12-14

## 2024-12-14 NOTE — TELEPHONE ENCOUNTER
Refill request received for Venlaflaxine 37.5 mg.  Preferred Rx, [facility] in [place].  Med pended to team to refill if appropriate.

## 2025-01-02 ENCOUNTER — TELEPHONE (OUTPATIENT)
Dept: HEMATOLOGY/ONCOLOGY | Facility: HOSPITAL | Age: 50
End: 2025-01-02
Payer: MEDICAID

## 2025-01-02 NOTE — TELEPHONE ENCOUNTER
Sapna isaacas to state that she has been experiencing strange sensations of pain and tingling in her left breast to her axilla. States that she just had a mammogram last month. She states that it is a dull pain that extends from the left side of her left breast into her axilla. Her left axilla sometimes has a tingling sensation. She wonders if she strained a muscle. Tried warm baths which did not help.  The pain is intermittent and can get as high as a 7/10. Denies fever/chills, SOB, chest pain or any other worrisome symptoms. She would like to get in sooner to see SALINAS Rosas.     Pt has a FUV with Dr. Rojas in Surgical Oncology on 1/14 and a FUV with Judy Cosme on 2/7.    Transferred pt to [facility] to see if they can get her a sooner appointment for evaluation.    Message sent to team.

## 2025-01-08 NOTE — PROGRESS NOTES
Subjective   Sapna Henriquez is a 49 y.o. female here for a follow-up visit.  She is doing well and has no breast complaints or concerns.    Treatment history:  Stage ypT0 N0 M0 breast cancer.  Clinical Stage 2B (T2 N2) diagnosed in January 2023    Diagnosed with left breast cancer and a positive left axillary lymph node in January 2023.  2.2 cm left breast mass in the 3 o'clock position and 3 abnormal axillary lymph nodes seen on ultrasound.  On 1/10/2023 left breast ultrasound core biopsy yielded invasive ductal carcinoma, grade 2-3, ductal carcinoma in situ, high nuclear grade, focus suspicious for lymphovascular space invasion. Left axillary ultrasound-guided biopsy yielded metastatic carcinoma involving cores of lymphoid tissue, ER +95%, NC +70%, HER2 equivocal not amplified by dual-maliha, MammaPrint high risk -0.154 luminal B type.  Clinical Stage 2B (T2 N2)  Pre-treatment CT showed a prominent internal mammary LN.    She had neoadjuvant chemotherapy (ddACx4 and taxol x12; Dr. Best). Her last chemotherapy was August 11, 2023.    Left mag seed localized lumpectomy and sentinel lymph node biopsy on September 20, 2023.    She completed radiation therapy-whole breast and regional adriana radiation 4,800 cGy / 4,800 cGy (15 of 15 fractions) completed in January 2024    Anastrozole started January 2024     Mammogram:  12/3/2024-left mammogram shows posttreatment changes and no evidence of malignancy.  Category 2  Imaging was personally reviewed and the findings discussed with the patient.    Objective     Physical Exam  Chest:          Comments: Lymph node exam shows no cervical, supraclavicular, or axillary lymphadenopathy.  Breast exam shows symmetric breasts bilaterally with no skin changes, no dominant masses and no nipple discharge in either breast.  Slight skin thickening in the central left breast.      Alert and oriented.      Assessment/Plan   Stage  ypT0 N0 M0  breast cancer diagnosed in January 2023.  Clinical  Stage 2B (T2 N2) 1/23    Diagnosed with left breast cancer and a positive left axillary lymph node in January 2023.  2.2 cm left breast mass in the 3 o'clock position and 3 abnormal axillary lymph nodes seen on ultrasound.  On 1/10/2023 left breast ultrasound core biopsy yielded invasive ductal carcinoma, grade 2-3, ductal carcinoma in situ, high nuclear grade, focus suspicious for lymphovascular space invasion. Left axillary ultrasound-guided biopsy yielded metastatic carcinoma involving cores of lymphoid tissue, ER +95%, SC +70%, HER2 equivocal not amplified by dual-maliha, MammaPrint high risk -0.154 luminal B type.  Clinical Stage 2B (T2 N2)    Doing well.  Normal breast exam and stable left mammogram.  No evidence of cancer recurrence.  Continue anastrozole   Continue follow-up with medical oncology.  Bilateral mammogram due in June 2025.    Follow-up with me as needed.  ***    Carol Rojas MD

## 2025-01-14 ENCOUNTER — APPOINTMENT (OUTPATIENT)
Dept: SURGICAL ONCOLOGY | Facility: CLINIC | Age: 50
End: 2025-01-14
Payer: MEDICAID

## 2025-01-14 DIAGNOSIS — Z17.0 MALIGNANT NEOPLASM OF UPPER-OUTER QUADRANT OF LEFT BREAST IN FEMALE, ESTROGEN RECEPTOR POSITIVE: Primary | ICD-10-CM

## 2025-01-14 DIAGNOSIS — C50.412 MALIGNANT NEOPLASM OF UPPER-OUTER QUADRANT OF LEFT BREAST IN FEMALE, ESTROGEN RECEPTOR POSITIVE: Primary | ICD-10-CM

## 2025-01-21 NOTE — PROGRESS NOTES
Subjective   Sapna Henriquez is a 49 y.o. female here for a follow-up visit.  She is doing well and has no breast complaints or concerns.    Treatment history:  Stage ypT0 N0 M0 breast cancer.  Clinical Stage 2B (T2 N2) diagnosed in January 2023    Diagnosed with left breast cancer and a positive left axillary lymph node in January 2023.  2.2 cm left breast mass in the 3 o'clock position and 3 abnormal axillary lymph nodes seen on ultrasound.  On 1/10/2023 left breast ultrasound core biopsy yielded invasive ductal carcinoma, grade 2-3, ductal carcinoma in situ, high nuclear grade, focus suspicious for lymphovascular space invasion. Left axillary ultrasound-guided biopsy yielded metastatic carcinoma involving cores of lymphoid tissue, ER +95%, MD +70%, HER2 equivocal not amplified by dual-maliha, MammaPrint high risk -0.154 luminal B type.  Clinical Stage 2B (T2 N2)  Pre-treatment CT showed a prominent internal mammary LN.    She had neoadjuvant chemotherapy (ddACx4 and taxol x12; Dr. Best). Her last chemotherapy was August 11, 2023.    Left mag seed localized lumpectomy and sentinel lymph node biopsy on September 20, 2023.    She completed radiation therapy-whole breast and regional adriana radiation 4,800 cGy / 4,800 cGy (15 of 15 fractions) completed in January 2024    Anastrozole started January 2024     Mammogram:  12/3/2024-left mammogram shows posttreatment changes and no evidence of malignancy.  Category 2  Imaging was personally reviewed and the findings discussed with the patient.    Objective     Physical Exam  Chest:          Comments: Lymph node exam shows no cervical, supraclavicular, or axillary lymphadenopathy.  Breast exam shows symmetric breasts bilaterally with no skin changes, no dominant masses and no nipple discharge in either breast.        Alert and oriented.      Assessment/Plan   Stage  ypT0 N0 M0  breast cancer diagnosed in January 2023.  Clinical Stage 2B (T2 N2) 1/23    Diagnosed with left  breast cancer and a positive left axillary lymph node in January 2023.  2.2 cm left breast mass in the 3 o'clock position and 3 abnormal axillary lymph nodes seen on ultrasound.  On 1/10/2023 left breast ultrasound core biopsy yielded invasive ductal carcinoma, grade 2-3, ductal carcinoma in situ, high nuclear grade, focus suspicious for lymphovascular space invasion. Left axillary ultrasound-guided biopsy yielded metastatic carcinoma involving cores of lymphoid tissue, ER +95%, MD +70%, HER2 equivocal not amplified by dual-maliha, MammaPrint high risk -0.154 luminal B type.  Clinical Stage 2B (T2 N2)    Doing well.  Normal breast exam and stable left mammogram.  No evidence of cancer recurrence.  Continue anastrozole   Continue follow-up with medical oncology.  Bilateral mammogram due in June 2025.    Follow-up with me as needed.      Carol Rojas MD

## 2025-01-28 ENCOUNTER — OFFICE VISIT (OUTPATIENT)
Dept: SURGICAL ONCOLOGY | Facility: CLINIC | Age: 50
End: 2025-01-28
Payer: MEDICAID

## 2025-01-28 VITALS
WEIGHT: 120 LBS | TEMPERATURE: 98.4 F | HEART RATE: 76 BPM | SYSTOLIC BLOOD PRESSURE: 104 MMHG | DIASTOLIC BLOOD PRESSURE: 74 MMHG | RESPIRATION RATE: 16 BRPM | BODY MASS INDEX: 25.19 KG/M2 | HEIGHT: 58 IN

## 2025-01-28 DIAGNOSIS — C77.1: Primary | ICD-10-CM

## 2025-01-28 DIAGNOSIS — C77.3: Primary | ICD-10-CM

## 2025-01-28 DIAGNOSIS — C50.912: Primary | ICD-10-CM

## 2025-01-28 PROCEDURE — 99213 OFFICE O/P EST LOW 20 MIN: CPT | Performed by: SURGERY

## 2025-01-28 PROCEDURE — 3008F BODY MASS INDEX DOCD: CPT | Performed by: SURGERY

## 2025-01-28 ASSESSMENT — PAIN SCALES - GENERAL: PAINLEVEL_OUTOF10: 0-NO PAIN

## 2025-01-28 NOTE — PATIENT INSTRUCTIONS
You no longer need to see a breast surgeon. Please follow-up w/ medical oncology & continue yearly mammograms.

## 2025-02-07 ENCOUNTER — TELEPHONE (OUTPATIENT)
Dept: HEMATOLOGY/ONCOLOGY | Facility: HOSPITAL | Age: 50
End: 2025-02-07

## 2025-02-07 ENCOUNTER — OFFICE VISIT (OUTPATIENT)
Dept: HEMATOLOGY/ONCOLOGY | Facility: HOSPITAL | Age: 50
End: 2025-02-07
Payer: MEDICAID

## 2025-02-07 ENCOUNTER — APPOINTMENT (OUTPATIENT)
Dept: HEMATOLOGY/ONCOLOGY | Facility: HOSPITAL | Age: 50
End: 2025-02-07
Payer: MEDICAID

## 2025-02-07 VITALS
HEIGHT: 58 IN | HEART RATE: 86 BPM | DIASTOLIC BLOOD PRESSURE: 71 MMHG | TEMPERATURE: 97.5 F | WEIGHT: 121.91 LBS | OXYGEN SATURATION: 98 % | RESPIRATION RATE: 16 BRPM | BODY MASS INDEX: 25.59 KG/M2 | SYSTOLIC BLOOD PRESSURE: 110 MMHG

## 2025-02-07 DIAGNOSIS — R23.2 HOT FLASHES: Primary | ICD-10-CM

## 2025-02-07 DIAGNOSIS — T45.1X5A HOT FLASHES RELATED TO AROMATASE INHIBITOR THERAPY: ICD-10-CM

## 2025-02-07 DIAGNOSIS — C50.412 MALIGNANT NEOPLASM OF UPPER-OUTER QUADRANT OF LEFT BREAST IN FEMALE, ESTROGEN RECEPTOR POSITIVE: ICD-10-CM

## 2025-02-07 DIAGNOSIS — Z12.31 SCREENING MAMMOGRAM FOR BREAST CANCER: ICD-10-CM

## 2025-02-07 DIAGNOSIS — R23.2 HOT FLASHES RELATED TO AROMATASE INHIBITOR THERAPY: ICD-10-CM

## 2025-02-07 DIAGNOSIS — Z17.0 MALIGNANT NEOPLASM OF UPPER-OUTER QUADRANT OF LEFT BREAST IN FEMALE, ESTROGEN RECEPTOR POSITIVE: ICD-10-CM

## 2025-02-07 PROCEDURE — 3008F BODY MASS INDEX DOCD: CPT

## 2025-02-07 PROCEDURE — 99214 OFFICE O/P EST MOD 30 MIN: CPT

## 2025-02-07 RX ORDER — EXEMESTANE 25 MG/1
25 TABLET ORAL DAILY
Qty: 90 TABLET | Refills: 3 | Status: SHIPPED | OUTPATIENT
Start: 2025-02-07 | End: 2026-02-07

## 2025-02-07 RX ORDER — GABAPENTIN 100 MG/1
300 CAPSULE ORAL NIGHTLY
Qty: 90 CAPSULE | Refills: 1 | Status: SHIPPED | OUTPATIENT
Start: 2025-02-07

## 2025-02-07 ASSESSMENT — PAIN SCALES - GENERAL: PAINLEVEL_OUTOF10: 0-NO PAIN

## 2025-02-07 NOTE — PROGRESS NOTES
Patient ID: Sanpa Henriquez is a 49 y.o. female.    The patient presents to clinic today for her history of breast cancer.     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, estrogen receptor positive (Multi)  Staging form: Breast, AJCC 8th Edition  - Clinical stage from 10/25/2023: Stage IIIB (cT2, cN3b, cM0, G3, ER+, IN+, HER2: Equivocal) - Signed by Starla Calles DO on 10/25/2023  - Pathologic stage from 10/25/2023: No Stage Recommended (ypT0, pN0, cM0) - Signed by Starla Calles DO on 10/25/2023        Diagnostic/Therapeutic History:    On 12/21/2022 she had diagnostic mammogram that was done that showed heterogeneously dense breast tissue with a focal symptom marker overlying the outer left breast  at anterior depth, adjacent to the marker is an irregular increased density mass with architectural distortion.  BI-RADS Category 5   Ultrasound: This corresponds with an area of the lump localized at the 3 o'clock position of the left breast 4 cm from the nipple there was a 2.2 x 2.2 x 1.1 cm lobulated irregularly irregular hypoechoic mass with multiple abnormal appearing left axillary  lymph nodes.      On 1/10/2023 she had left breast ultrasound-guided core needle biopsy showed invasive ductal carcinoma, grade 2-3, DCIS, high nuclear grade ER 95%, IN 70%, HER2 2+, nonamplified by dual ROMERO MammaPrint index: -0.154, high risk luminal B left axilla ultrasound-guided  core needle biopsy showed metastatic carcinoma       CT C/A/P showed focal breast tissue nodularity and prominent left axillary LN in addition to left Internal mammary LN with no evidence of distant disease; no evidence of bone mets on bone scan     8/11/2023: completed neoadjuvant chemotherapy with ddAC followed by weekly taxol    09/20/2023: Dr Rojas Performed a left PM and SLNB (0/3) that showed a complete pathological response     Jan 2024 - Started anastrozole    July 2024 - switched to exemestane     History of Present Illness  (HPI)/Interval History:  Ms. Henriquez presents for presents today for follow-up, treatment and surveillance. She is on exemestane.     She denies any breast cancer concerns. Her lymphedema in her left arm is okay. Still has uncomfortable axillary sensation but no masses     She denies any chest pain or breathing issues.     She denies any vision changes or headache issues, dizziness, loss of balance.     She denies any new or unexplained bone aches or pains.  Generalized bone pain is gone with switch to exemestane.     She denies any skin lesions or masses, hair loss, nail changes, or oral sores.    Her appetite is better.       PMH:  No PMH , horseshoe kidney     PSH: tubal ligation      Allergies: NKDA     No meds     Reproductive hx: menarche 14, 9 years ago no menses since ligation, , 2nd one who got , OCP x couple years, No HRT     Family hx: mom at age 41 with breast cancer, grandma breast cancer and ovarian cancer  at 73, greaatgrandma breast cancer  at 58, uncle for mom’s side colon cancer, grandfather on mother side has skin cancer     Social hx: non smoker, alcohol twice a week       Review of Systems:  14-point ROS otherwise negative, as per HPI.    Past Medical History:   Diagnosis Date    Breast cancer (Multi)     Personal history of irradiation        Past Surgical History:   Procedure Laterality Date    BREAST LUMPECTOMY Left     ENDOMETRIAL ABLATION      TUBAL LIGATION         Social History     Socioeconomic History    Marital status:    Tobacco Use    Smoking status: Never     Passive exposure: Never    Smokeless tobacco: Never   Vaping Use    Vaping status: Never Used   Substance and Sexual Activity    Alcohol use: Not Currently     Alcohol/week: 4.0 standard drinks of alcohol     Types: 4 Shots of liquor per week    Drug use: Never    Sexual activity: Not Currently     Social Drivers of Health     Financial Resource Strain: Not on File (2022)    [facility]    Financial Resource Strain     Financial Resource Strain: 0   Food Insecurity: Not on File (2024)    Received from [facility] [employer]    Food Insecurity     Food: 0   Transportation Needs: Not on File (2022)    Received from [facility]    Transportation Needs     Transportation: 0   Physical Activity: Not on File (2022)    Received from [facility]    Physical Activity     Physical Activity: 0   Stress: Not on File (2022)    Received from [facility]    Stress     Stress: 0   Social Connections: Not on File (2024)    Received from [facility]    Social Connections     Connectedness: 0   Housing Stability: Not on File (2022)    Received from [facility]    Housing Stability     Housin       No Known Allergies      Current Outpatient Medications:     cholecalciferol, vitamin D3, (VITAMIN D3 ORAL), Take 2,000 Int'l Units/day by mouth once daily. gummies, Disp: , Rfl:     exemestane (Aromasin) 25 mg tablet, Take 1 tablet (25 mg total) by mouth once daily.  Take after a meal.  Try to take at the same time each day., Disp: 90 tablet, Rfl: 1    gabapentin (Neurontin) 100 mg capsule, Take 3 capsules (300 mg) by mouth once daily at bedtime. Please take 100 mg (1 tab) for 2-3 nights then go to 200 mg (2 tabs) for 2-3 nights then to the full dose of 300 mg (3 tabs) nightly until discontinued, Disp: 90 capsule, Rfl: 1     Objective    BSA: There is no height or weight on file to calculate BSA.  There were no vitals taken for this visit.    Performance Status:  The ECOG performance scale today is ECO- Restricted in physically strenuous activity.  Carries out light duty.     Physical Exam  Vitals reviewed.   Constitutional:       General: She is not in acute distress.     Appearance: She is not toxic-appearing.   HENT:      Mouth/Throat:      Mouth: Mucous membranes are moist.      Pharynx: Oropharynx is clear.   Eyes:      General: No scleral icterus.     Extraocular Movements: Extraocular  movements intact.      Conjunctiva/sclera: Conjunctivae normal.   Cardiovascular:      Rate and Rhythm: Normal rate and regular rhythm.   Pulmonary:      Effort: Pulmonary effort is normal. No respiratory distress.   Chest:      Comments: Deferred exam given recent exam with Dr. Ya  Musculoskeletal:         General: No swelling or tenderness.   Skin:     General: Skin is warm and dry.      Coloration: Skin is not jaundiced.   Neurological:      General: No focal deficit present.      Mental Status: She is alert and oriented to person, place, and time.   Psychiatric:         Mood and Affect: Mood normal.         Behavior: Behavior normal.         Thought Content: Thought content normal.         Judgment: Judgment normal.         Laboratory Data:  Lab Results   Component Value Date    WBC 4.2 (L) 07/25/2024    HGB 14.5 07/25/2024    HCT 43.1 07/25/2024    MCV 89 07/25/2024     07/25/2024    ANC 6.42 04/28/2023       Chemistry    Lab Results   Component Value Date/Time     07/25/2024 0925    K 4.2 07/25/2024 0925     07/25/2024 0925    CO2 29 07/25/2024 0925    BUN 16 07/25/2024 0925    CREATININE 0.84 07/25/2024 0925    Lab Results   Component Value Date/Time    CALCIUM 10.2 07/25/2024 0925    ALKPHOS 150 (H) 07/25/2024 0925    AST 17 07/25/2024 0925    ALT 16 07/25/2024 0925    BILITOT 0.8 07/25/2024 0925             Radiology:  BI mammo left diagnostic tomosynthesis  Narrative: Interpreted By:  Mimi Hess,   STUDY:  BI MAMMO LEFT DIAGNOSTIC TOMOSYNTHESIS;  12/3/2024 11:36 am      ACCESSION NUMBER(S):  [id number]      ORDERING CLINICIAN:  JUAN PABLO YA      INDICATION:  Left breast lumpectomy, chemotherapy, and radiation      COMPARISON:  06/03/2024, 09/20/2023, 07/10/2023, and priors dating back to 2022      FINDINGS:  2D and tomosynthesis images were reviewed at 1 mm slice thickness.      Density:  There are scattered areas of fibroglandular density.      Postlumpectomy changes  including parenchymal scarring and surgical  clips are seen in the superolateral left breast at posterior depth. A  biopsy tissue marker is again seen in the central left breast at  middle to posterior depth. No suspicious masses or calcifications are  identified.      Impression: No mammographic evidence of malignancy in the left breast.  Recommendation is for the patient to return for routine annual  mammogram in June 2025 or sooner if clinically indicated.      BI-RADS CATEGORY:      BI-RADS Category:  2 Benign.  Recommendation:  Annual Screening.  Recommended Date:  6 Months.  Laterality:  Bilateral.      For any future breast imaging appointments, please call [phone]).          MACRO:  None      Signed by: Mimi Hess 12/3/2024 3:45 PM  Dictation workstation:   [id number]       No results found for this or any previous visit from the past 365 days.          Assessment/Plan:      47 year old female overall medically healthy with recent diagnosis of left sided cT2N1 IDC ER 95%, OR 70%, HER2 2+, nonamplified by dual ROMERO MammaPrint index: -0.154, high risk luminal  B left axilla ultrasound-guided core needle biopsy showed metastatic carcinoma, discussed in TB with plan for neoadjuvant chemotherapy      On 12/21/2022 she had diagnostic mammogram that was done that showed heterogeneously dense breast tissue with a focal symptom marker overlying the outer left breast at anterior depth, adjacent to the marker is an irregular increased density mass with  architectural distortion.  BI-RADS Category 5   Ultrasound: This corresponds with an area of the lump localized at the 3 o'clock position of the left breast 4 cm from the nipple there was a 2.2 x 2.2 x 1.1 cm lobulated irregularly  irregular hypoechoic mass with multiple abnormal appearing left axillary lymph nodes.      On 1/10/2023 she had left breast ultrasound-guided core needle biopsy showed invasive ductal carcinoma, grade 2-3, DCIS, high  nuclear grade ER 95%, IA 70%, HER2 2+, nonamplified by dual ROMERO MammaPrint index: -0.154, high risk luminal B left axilla ultrasound-guided  core needle biopsy showed metastatic carcinoma     I reviewed with her the events that led to her diagnosis of breast cancer. We reviewed all the procedures and diagnostic imaging she underwent thus far. I discussed the features of her breast cancer that include the size, grade, lymph node status and  hormone receptor/ her2-xin status.     CT C/A/P showed focal breast tissue nodularity and prominent left axillary LN in addition to left Internal mammary LN with no evidence of distant disease; no evidence of bone mets on bone scan     8/11/2023: completed neoadjuvant chemotherapy with ddAC followed by weekly taxol    09/20/2023: Dr Rojas Performed a left PM and SLNB (0/3) that showed a complete pathological response     - s/p Whole breast and regional adriana radiation to a dose of 42.56 Gy in 16 fractions with a simultaneous integrated boost to the positive intramammary node and lumpectomy bed    - Switch to exemestane is much better tolerated, will continue  - Grade 1-2 hot flashes: continue gabapentin at bedtime  - referral to [facility] for assistance with management   - Screening mammogram in June 2025, ordered today     There is no evidence of breast cancer recurrence based on her clinical exam today.     Fibroids  Pelvic/Uterine/Ovarian US: Fibroid in the right lower uterine body and Probable hemorrhagic corpus luteum left ovary.   - Follow-up with GYN      Thyroid nodules  Thyroid US: Right thyroid lobe 1.5 cm and left thyroid lobe 0.6 cm TIRADS 2 mixed solid and cystic nodules which are likely benign   - Likely benign no follow-up imaging recommended   - Defer to PCP for continued monitoring     RTC in 6 months with DALILA Carbajal PA-C  Hematology and Medical Oncology  [facility]

## 2025-02-25 ENCOUNTER — APPOINTMENT (OUTPATIENT)
Dept: INTEGRATIVE MEDICINE | Facility: CLINIC | Age: 50
End: 2025-02-25
Payer: MEDICAID

## 2025-04-21 ENCOUNTER — APPOINTMENT (OUTPATIENT)
Dept: INTEGRATIVE MEDICINE | Facility: CLINIC | Age: 50
End: 2025-04-21
Payer: MEDICAID

## 2025-04-23 ENCOUNTER — TELEPHONE (OUTPATIENT)
Dept: HEMATOLOGY/ONCOLOGY | Facility: HOSPITAL | Age: 50
End: 2025-04-23
Payer: MEDICAID

## 2025-04-23 DIAGNOSIS — C50.412 MALIGNANT NEOPLASM OF UPPER-OUTER QUADRANT OF LEFT BREAST IN FEMALE, ESTROGEN RECEPTOR POSITIVE: Primary | ICD-10-CM

## 2025-04-23 DIAGNOSIS — Z17.0 MALIGNANT NEOPLASM OF UPPER-OUTER QUADRANT OF LEFT BREAST IN FEMALE, ESTROGEN RECEPTOR POSITIVE: Primary | ICD-10-CM

## 2025-04-23 DIAGNOSIS — N63.20 MASS OF LEFT BREAST, UNSPECIFIED QUADRANT: ICD-10-CM

## 2025-04-23 NOTE — TELEPHONE ENCOUNTER
"Reason for Conversation  New Lump  Background   Returned call to pt, who said she saw her gyn today & she ordered an U/S for her L breast, where pt was able to palpate \"lump\" in Left breast in the last few days.  She states she has pain 4/10 when she touches it or lays on it only.  Denies redness, swelling, drainage or any other symptom.  Offered her a F/U with SALINAS Rosas on 5/2 at 9:30 am but understands we may need to move the appt if we can get the U/S at a later time.  She agreed to this plan.  Notified provider, who ordered U/S.    Disposition   Sent to schedulers to contact pt & schedule U/S & F/U same day, 5/2.     "

## 2025-05-02 ENCOUNTER — HOSPITAL ENCOUNTER (OUTPATIENT)
Dept: RADIOLOGY | Facility: HOSPITAL | Age: 50
Discharge: HOME | End: 2025-05-02
Payer: MEDICAID

## 2025-05-02 ENCOUNTER — APPOINTMENT (OUTPATIENT)
Dept: RADIOLOGY | Facility: HOSPITAL | Age: 50
End: 2025-05-02
Payer: MEDICAID

## 2025-05-02 ENCOUNTER — OFFICE VISIT (OUTPATIENT)
Dept: HEMATOLOGY/ONCOLOGY | Facility: HOSPITAL | Age: 50
End: 2025-05-02
Payer: MEDICAID

## 2025-05-02 VITALS
WEIGHT: 124 LBS | OXYGEN SATURATION: 97 % | SYSTOLIC BLOOD PRESSURE: 127 MMHG | HEART RATE: 62 BPM | RESPIRATION RATE: 16 BRPM | BODY MASS INDEX: 25.51 KG/M2 | TEMPERATURE: 97 F | DIASTOLIC BLOOD PRESSURE: 85 MMHG

## 2025-05-02 DIAGNOSIS — C50.412 MALIGNANT NEOPLASM OF UPPER-OUTER QUADRANT OF LEFT BREAST IN FEMALE, ESTROGEN RECEPTOR POSITIVE: Primary | ICD-10-CM

## 2025-05-02 DIAGNOSIS — Z17.0 MALIGNANT NEOPLASM OF UPPER-OUTER QUADRANT OF LEFT BREAST IN FEMALE, ESTROGEN RECEPTOR POSITIVE: Primary | ICD-10-CM

## 2025-05-02 DIAGNOSIS — Z17.0 MALIGNANT NEOPLASM OF UPPER-OUTER QUADRANT OF LEFT BREAST IN FEMALE, ESTROGEN RECEPTOR POSITIVE: ICD-10-CM

## 2025-05-02 DIAGNOSIS — N63.20 MASS OF LEFT BREAST, UNSPECIFIED QUADRANT: ICD-10-CM

## 2025-05-02 DIAGNOSIS — C50.412 MALIGNANT NEOPLASM OF UPPER-OUTER QUADRANT OF LEFT BREAST IN FEMALE, ESTROGEN RECEPTOR POSITIVE: ICD-10-CM

## 2025-05-02 PROCEDURE — 76982 USE 1ST TARGET LESION: CPT | Mod: LT

## 2025-05-02 PROCEDURE — 99214 OFFICE O/P EST MOD 30 MIN: CPT

## 2025-05-02 PROCEDURE — 99214 OFFICE O/P EST MOD 30 MIN: CPT | Mod: 25

## 2025-05-02 PROCEDURE — 76642 ULTRASOUND BREAST LIMITED: CPT | Mod: LT

## 2025-05-02 PROCEDURE — 1036F TOBACCO NON-USER: CPT

## 2025-05-02 NOTE — PROGRESS NOTES
Patient ID: Sapna Henriquez is a 49 y.o. female.    The patient presents to clinic today for her history of breast cancer.     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, estrogen receptor positive  Staging form: Breast, AJCC 8th Edition  - Clinical stage from 10/25/2023: Stage IIIB (cT2, cN3b, cM0, G3, ER+, CA+, HER2: Equivocal) - Signed by Starla Calles DO on 10/25/2023  - Pathologic stage from 10/25/2023: No Stage Recommended (ypT0, pN0, cM0) - Signed by Starla Calles DO on 10/25/2023    Diagnostic/Therapeutic History:    On 12/21/2022 she had diagnostic mammogram that was done that showed heterogeneously dense breast tissue with a focal symptom marker overlying the outer left breast  at anterior depth, adjacent to the marker is an irregular increased density mass with architectural distortion.  BI-RADS Category 5   Ultrasound: This corresponds with an area of the lump localized at the 3 o'clock position of the left breast 4 cm from the nipple there was a 2.2 x 2.2 x 1.1 cm lobulated irregularly irregular hypoechoic mass with multiple abnormal appearing left axillary  lymph nodes.      On 1/10/2023 she had left breast ultrasound-guided core needle biopsy showed invasive ductal carcinoma, grade 2-3, DCIS, high nuclear grade ER 95%, CA 70%, HER2 2+, nonamplified by dual ROMERO MammaPrint index: -0.154, high risk luminal B left axilla ultrasound-guided  core needle biopsy showed metastatic carcinoma       CT C/A/P showed focal breast tissue nodularity and prominent left axillary LN in addition to left Internal mammary LN with no evidence of distant disease; no evidence of bone mets on bone scan     8/11/2023: completed neoadjuvant chemotherapy with ddAC followed by weekly taxol    09/20/2023: Dr Rojas Performed a left PM and SLNB (0/3) that showed a complete pathological response     Jan 2024 - Started anastrozole    July 2024 - switched to exemestane     History of Present Illness (HPI)/Interval  History:  Ms. Henriquez presents for presents today for a problem focused visit, palpable left breast lump. She is complaint on exemestane.     She was seen by her GYN in April and at that time she palpated a lump in the left breast with associated pain of 4/10 when she touches / lays on it. She denies any skin changes, nipple discharge.     She denies any chest pain or breathing issues.     She denies any vision changes or headache issues, dizziness, loss of balance. She has headaches in the morning, thinks its allergy related. Goes away with NSAIDs/ tylenol.     She denies any new or unexplained bone aches or pains.  Generalized bone pain is gone with switch to exemestane.     She denies any skin lesions or masses, hair loss, nail changes, or oral sores.    Energy is better.     Her appetite is better.       PMH:  No PMH , horseshoe kidney     PSH: tubal ligation      Allergies: NKDA     No meds     Reproductive hx: menarche 14, 9 years ago no menses since ligation, , 2nd one who got , OCP x couple years, No HRT     Family hx: mom at age 41 with breast cancer, grandma breast cancer and ovarian cancer  at 73, greaatgrandma breast cancer  at 58, uncle for mom’s side colon cancer, grandfather on mother side has skin cancer     Social hx: non smoker, alcohol twice a week       Review of Systems:  14-point ROS otherwise negative, as per HPI.    Past Medical History:   Diagnosis Date    Breast cancer (Multi)     Personal history of irradiation        Past Surgical History:   Procedure Laterality Date    BREAST LUMPECTOMY Left     ENDOMETRIAL ABLATION      TUBAL LIGATION         Social History     Socioeconomic History    Marital status:    Tobacco Use    Smoking status: Never     Passive exposure: Never    Smokeless tobacco: Never   Vaping Use    Vaping status: Never Used   Substance and Sexual Activity    Alcohol use: Not Currently     Alcohol/week: 4.0 standard drinks of alcohol     Types: 4  Shots of liquor per week    Drug use: Never    Sexual activity: Not Currently     Social Drivers of Health     Financial Resource Strain: Not on File (2022)    Received from [employer]    Financial Resource Strain     Financial Resource Strain: 0   Food Insecurity: Not on File (2024)    Received from [employer]    Food Insecurity     Food: 0   Transportation Needs: Not on File (2022)    Received from [employer]    Transportation Needs     Transportation: 0   Physical Activity: Not on File (2022)    Received from [employer]    Physical Activity     Physical Activity: 0   Stress: Not on File (2022)    Received from [employer]    Stress     Stress: 0   Social Connections: Not on File (2024)    Received from [employer]    Social Connections     Connectedness: 0   Housing Stability: Not on File (2022)    Received from [employer]    Housing Stability     Housin       No Known Allergies      Current Outpatient Medications:     cholecalciferol, vitamin D3, (VITAMIN D3 ORAL), Take 2,000 Int'l Units/day by mouth once daily. gummies, Disp: , Rfl:     exemestane (Aromasin) 25 mg tablet, Take 1 tablet (25 mg total) by mouth once daily.  Take after a meal.  Try to take at the same time each day., Disp: 90 tablet, Rfl: 3    gabapentin (Neurontin) 100 mg capsule, Take 3 capsules (300 mg) by mouth once daily at bedtime. Please take 100 mg (1 tab) for 2-3 nights then go to 200 mg (2 tabs) for 2-3 nights then to the full dose of 300 mg (3 tabs) nightly until discontinued, Disp: 90 capsule, Rfl: 1     Objective    BSA: There is no height or weight on file to calculate BSA.  There were no vitals taken for this visit.    Performance Status:  The ECOG performance scale today is ECO- Restricted in physically strenuous activity.  Carries out light duty.     Physical Exam  Vitals reviewed.   Constitutional:       General: She is awake. She is not in acute distress.     Appearance: Normal appearance. She is not toxic-appearing.    HENT:      Head: Normocephalic and atraumatic.      Mouth/Throat:      Mouth: Mucous membranes are moist.      Pharynx: Oropharynx is clear.   Eyes:      Conjunctiva/sclera: Conjunctivae normal.      Pupils: Pupils are equal, round, and reactive to light.   Neck:      Trachea: Trachea and phonation normal. No tracheal tenderness.   Cardiovascular:      Rate and Rhythm: Normal rate and regular rhythm.      Pulses: Normal pulses.      Heart sounds: Normal heart sounds. No murmur heard.  Pulmonary:      Effort: Pulmonary effort is normal. No respiratory distress.      Breath sounds: Normal breath sounds.   Chest:      Chest wall: No mass or deformity.   Breasts:     Right: No swelling, mass, nipple discharge, skin change or tenderness.      Left: Tenderness (medial aspect of the breast) present. No swelling, mass, nipple discharge or skin change.      Comments: S/p left lumpectomy. No palpable findings on exam   Abdominal:      General: Bowel sounds are normal. There is no distension.      Palpations: Abdomen is soft. There is no mass.      Tenderness: There is no abdominal tenderness. There is no guarding.   Musculoskeletal:         General: Normal range of motion.      Cervical back: Normal range of motion.   Lymphadenopathy:      Upper Body:      Right upper body: No supraclavicular, axillary or pectoral adenopathy.      Left upper body: No supraclavicular, axillary or pectoral adenopathy.   Skin:     General: Skin is warm and dry.      Capillary Refill: Capillary refill takes less than 2 seconds.      Findings: No rash.   Neurological:      General: No focal deficit present.      Mental Status: She is alert and oriented to person, place, and time.      Motor: No weakness.   Psychiatric:         Mood and Affect: Mood normal.         Behavior: Behavior normal.         Thought Content: Thought content normal.         Judgment: Judgment normal.         Laboratory Data:  Lab Results   Component Value Date    WBC 4.2  (L) 07/25/2024    HGB 14.5 07/25/2024    HCT 43.1 07/25/2024    MCV 89 07/25/2024     07/25/2024    ANC 6.42 04/28/2023       Chemistry    Lab Results   Component Value Date/Time     07/25/2024 0925    K 4.2 07/25/2024 0925     07/25/2024 0925    CO2 29 07/25/2024 0925    BUN 16 07/25/2024 0925    CREATININE 0.84 07/25/2024 0925    Lab Results   Component Value Date/Time    CALCIUM 10.2 07/25/2024 0925    ALKPHOS 150 (H) 07/25/2024 0925    AST 17 07/25/2024 0925    ALT 16 07/25/2024 0925    BILITOT 0.8 07/25/2024 0925             Radiology:  BI mammo left diagnostic tomosynthesis  Narrative: Interpreted By:  Mimi Hess,   STUDY:  BI MAMMO LEFT DIAGNOSTIC TOMOSYNTHESIS;  12/3/2024 11:36 am      ACCESSION NUMBER(S):  [id number]      ORDERING CLINICIAN:  JUAN PABLO YA      INDICATION:  Left breast lumpectomy, chemotherapy, and radiation      COMPARISON:  06/03/2024, 09/20/2023, 07/10/2023, and priors dating back to 2022      FINDINGS:  2D and tomosynthesis images were reviewed at 1 mm slice thickness.      Density:  There are scattered areas of fibroglandular density.      Postlumpectomy changes including parenchymal scarring and surgical  clips are seen in the superolateral left breast at posterior depth. A  biopsy tissue marker is again seen in the central left breast at  middle to posterior depth. No suspicious masses or calcifications are  identified.      Impression: No mammographic evidence of malignancy in the left breast.  Recommendation is for the patient to return for routine annual  mammogram in June 2025 or sooner if clinically indicated.      BI-RADS CATEGORY:      BI-RADS Category:  2 Benign.  Recommendation:  Annual Screening.  Recommended Date:  6 Months.  Laterality:  Bilateral.      For any future breast imaging appointments, please call [phone]).          MACRO:  None      Signed by: Mimi Hess 12/3/2024 3:45 PM  Dictation workstation:   [id number]        No results found for this or any previous visit from the past 365 days.          Assessment/Plan:      47 year old female overall medically healthy with recent diagnosis of left sided cT2N1 IDC ER 95%, OH 70%, HER2 2+, nonamplified by dual ROMERO MammaPrint index: -0.154, high risk luminal  B left axilla ultrasound-guided core needle biopsy showed metastatic carcinoma, discussed in TB with plan for neoadjuvant chemotherapy      On 12/21/2022 she had diagnostic mammogram that was done that showed heterogeneously dense breast tissue with a focal symptom marker overlying the outer left breast at anterior depth, adjacent to the marker is an irregular increased density mass with  architectural distortion.  BI-RADS Category 5   Ultrasound: This corresponds with an area of the lump localized at the 3 o'clock position of the left breast 4 cm from the nipple there was a 2.2 x 2.2 x 1.1 cm lobulated irregularly  irregular hypoechoic mass with multiple abnormal appearing left axillary lymph nodes.      On 1/10/2023 she had left breast ultrasound-guided core needle biopsy showed invasive ductal carcinoma, grade 2-3, DCIS, high nuclear grade ER 95%, OH 70%, HER2 2+, nonamplified by dual ROMERO MammaPrint index: -0.154, high risk luminal B left axilla ultrasound-guided  core needle biopsy showed metastatic carcinoma     I reviewed with her the events that led to her diagnosis of breast cancer. We reviewed all the procedures and diagnostic imaging she underwent thus far. I discussed the features of her breast cancer that include the size, grade, lymph node status and  hormone receptor/ her2-xin status.     CT C/A/P showed focal breast tissue nodularity and prominent left axillary LN in addition to left Internal mammary LN with no evidence of distant disease; no evidence of bone mets on bone scan     8/11/2023: completed neoadjuvant chemotherapy with ddAC followed by weekly taxol    09/20/2023: Dr Rojas Performed a left PM and SLNB  (0/3) that showed a complete pathological response     - s/p Whole breast and regional adriana radiation to a dose of 42.56 Gy in 16 fractions with a simultaneous integrated boost to the positive intramammary node and lumpectomy bed    - Switch to exemestane is much better tolerated, will continue  - Grade 1-2 hot flashes: continue gabapentin at bedtime  - Left breast US done today showed no mass or lesion at area of concern. Continue with screening mammogram in June 2025 as planned     There is no evidence of breast cancer recurrence based on her clinical exam today.     Fibroids  Pelvic/Uterine/Ovarian US: Fibroid in the right lower uterine body and Probable hemorrhagic corpus luteum left ovary.   - Follow-up with GYN      Thyroid nodules  Thyroid US: Right thyroid lobe 1.5 cm and left thyroid lobe 0.6 cm TIRADS 2 mixed solid and cystic nodules which are likely benign   - Likely benign no follow-up imaging recommended   - Defer to PCP for continued monitoring     RTC in 6 months with DALILA Carbajal PA-C  Hematology and Medical Oncology  [facility]

## 2025-06-04 ENCOUNTER — HOSPITAL ENCOUNTER (OUTPATIENT)
Dept: RADIOLOGY | Facility: HOSPITAL | Age: 50
Discharge: HOME | End: 2025-06-04
Payer: MEDICAID

## 2025-06-04 VITALS — WEIGHT: 124 LBS | HEIGHT: 62 IN | BODY MASS INDEX: 22.82 KG/M2

## 2025-06-04 DIAGNOSIS — Z17.0 MALIGNANT NEOPLASM OF UPPER-OUTER QUADRANT OF LEFT BREAST IN FEMALE, ESTROGEN RECEPTOR POSITIVE: ICD-10-CM

## 2025-06-04 DIAGNOSIS — C50.412 MALIGNANT NEOPLASM OF UPPER-OUTER QUADRANT OF LEFT BREAST IN FEMALE, ESTROGEN RECEPTOR POSITIVE: ICD-10-CM

## 2025-06-04 DIAGNOSIS — Z12.31 SCREENING MAMMOGRAM FOR BREAST CANCER: ICD-10-CM

## 2025-06-04 PROCEDURE — 77063 BREAST TOMOSYNTHESIS BI: CPT | Performed by: RADIOLOGY

## 2025-06-04 PROCEDURE — 77063 BREAST TOMOSYNTHESIS BI: CPT

## 2025-06-04 PROCEDURE — 77067 SCR MAMMO BI INCL CAD: CPT | Performed by: RADIOLOGY

## 2025-06-09 ENCOUNTER — TELEPHONE (OUTPATIENT)
Dept: HEMATOLOGY/ONCOLOGY | Facility: HOSPITAL | Age: 50
End: 2025-06-09
Payer: MEDICAID

## 2025-06-09 NOTE — TELEPHONE ENCOUNTER
Pt called back, reviewed mammogram benign. Pt will continue with yearly followup.   Nothing further needed.

## 2025-06-09 NOTE — TELEPHONE ENCOUNTER
Attempted to call pt to inform her of a benign mammogram. Plan to continue with yearly screenings however, mailbox was full and unable to leave a voicemail.

## 2025-08-15 ENCOUNTER — APPOINTMENT (OUTPATIENT)
Dept: HEMATOLOGY/ONCOLOGY | Facility: HOSPITAL | Age: 50
End: 2025-08-15
Payer: MEDICAID